# Patient Record
Sex: MALE | Race: WHITE | NOT HISPANIC OR LATINO | Employment: FULL TIME | URBAN - METROPOLITAN AREA
[De-identification: names, ages, dates, MRNs, and addresses within clinical notes are randomized per-mention and may not be internally consistent; named-entity substitution may affect disease eponyms.]

---

## 2019-02-20 ENCOUNTER — HISTORICAL (OUTPATIENT)
Dept: ADMINISTRATIVE | Facility: HOSPITAL | Age: 59
End: 2019-02-20

## 2019-02-20 LAB
ABS NEUT (OLG): 3.3 X10(3)/MCL (ref 2.1–9.2)
ALBUMIN SERPL-MCNC: 4.6 GM/DL (ref 3.4–5)
ALBUMIN/GLOB SERPL: 2.19 {RATIO} (ref 1.5–2.5)
ALP SERPL-CCNC: 48 UNIT/L (ref 38–126)
ALT SERPL-CCNC: 27 UNIT/L (ref 7–52)
APPEARANCE, UA: CLEAR
AST SERPL-CCNC: 25 UNIT/L (ref 15–37)
BACTERIA #/AREA URNS AUTO: NORMAL /HPF
BILIRUB SERPL-MCNC: 0.7 MG/DL (ref 0.2–1)
BILIRUB UR QL STRIP: NEGATIVE MG/DL
BILIRUBIN DIRECT+TOT PNL SERPL-MCNC: 0.2 MG/DL (ref 0–0.5)
BILIRUBIN DIRECT+TOT PNL SERPL-MCNC: 0.5 MG/DL
BUN SERPL-MCNC: 14 MG/DL (ref 7–18)
CALCIUM SERPL-MCNC: 9.4 MG/DL (ref 8.5–10)
CHLORIDE SERPL-SCNC: 104 MMOL/L (ref 98–107)
CHOLEST SERPL-MCNC: 186 MG/DL (ref 0–200)
CHOLEST/HDLC SERPL: 3.7 {RATIO}
CO2 SERPL-SCNC: 33 MMOL/L (ref 21–32)
COLOR UR: YELLOW
CREAT SERPL-MCNC: 0.92 MG/DL (ref 0.6–1.3)
ERYTHROCYTE [DISTWIDTH] IN BLOOD BY AUTOMATED COUNT: 12.1 % (ref 11.5–17)
GLOBULIN SER-MCNC: 2 GM/DL (ref 1.2–3)
GLUCOSE (UA): NEGATIVE MG/DL
GLUCOSE SERPL-MCNC: 106 MG/DL (ref 74–106)
HCT VFR BLD AUTO: 50 % (ref 42–52)
HDLC SERPL-MCNC: 50 MG/DL (ref 35–60)
HGB BLD-MCNC: 16 GM/DL (ref 14–18)
HGB UR QL STRIP: NEGATIVE UNIT/L
KETONES UR QL STRIP: NORMAL MG/DL
LDLC SERPL CALC-MCNC: 107 MG/DL (ref 0–129)
LEUKOCYTE ESTERASE UR QL STRIP: NEGATIVE UNIT/L
LYMPHOCYTES # BLD AUTO: 1.8 X10(3)/MCL (ref 0.6–3.4)
LYMPHOCYTES NFR BLD AUTO: 31.4 % (ref 13–40)
MCH RBC QN AUTO: 32.2 PG (ref 27–31.2)
MCHC RBC AUTO-ENTMCNC: 32 GM/DL (ref 32–36)
MCV RBC AUTO: 101 FL (ref 80–94)
MONOCYTES # BLD AUTO: 0.7 X10(3)/MCL (ref 0.1–1.3)
MONOCYTES NFR BLD AUTO: 11.7 % (ref 0.1–24)
NEUTROPHILS NFR BLD AUTO: 56.9 % (ref 47–80)
NITRITE UR QL STRIP.AUTO: NEGATIVE
PH UR STRIP: 6 [PH]
PLATELET # BLD AUTO: 252 X10(3)/MCL (ref 130–400)
PMV BLD AUTO: 8.9 FL (ref 9.4–12.4)
POTASSIUM SERPL-SCNC: 4.5 MMOL/L (ref 3.5–5.1)
PROT SERPL-MCNC: 6.7 GM/DL (ref 6.4–8.2)
PROT UR QL STRIP: NEGATIVE MG/DL
PSA SERPL-MCNC: 0.41 NG/ML (ref 0–3.5)
RBC # BLD AUTO: 4.97 X10(6)/MCL (ref 4.7–6.1)
RBC #/AREA URNS HPF: NORMAL /HPF
SODIUM SERPL-SCNC: 139 MMOL/L (ref 136–145)
SP GR UR STRIP: 1.02
SQUAMOUS EPITHELIAL, UA: NORMAL /LPF
TRIGL SERPL-MCNC: 62 MG/DL (ref 30–150)
UROBILINOGEN UR STRIP-ACNC: 0.2 MG/DL
VLDLC SERPL CALC-MCNC: 12.4 MG/DL
WBC # SPEC AUTO: 5.8 X10(3)/MCL (ref 4.5–11.5)
WBC #/AREA URNS AUTO: NORMAL /[HPF]

## 2019-11-01 ENCOUNTER — HISTORICAL (OUTPATIENT)
Dept: ADMINISTRATIVE | Facility: HOSPITAL | Age: 59
End: 2019-11-01

## 2019-11-01 LAB
BUN SERPL-MCNC: 19 MG/DL (ref 7–18)
CALCIUM SERPL-MCNC: 9.9 MG/DL (ref 8.5–10)
CHLORIDE SERPL-SCNC: 101 MMOL/L (ref 98–107)
CO2 SERPL-SCNC: 30 MMOL/L (ref 21–32)
CREAT SERPL-MCNC: 1.1 MG/DL (ref 0.6–1.3)
CREAT/UREA NIT SERPL: 17.3
GLUCOSE SERPL-MCNC: 112 MG/DL (ref 74–106)
POTASSIUM SERPL-SCNC: 5.1 MMOL/L (ref 3.5–5.1)
SODIUM SERPL-SCNC: 138 MMOL/L (ref 136–145)

## 2020-02-27 ENCOUNTER — HISTORICAL (OUTPATIENT)
Dept: ADMINISTRATIVE | Facility: HOSPITAL | Age: 60
End: 2020-02-27

## 2020-02-27 LAB
ABS NEUT (OLG): 5.2 X10(3)/MCL (ref 2.1–9.2)
ALBUMIN SERPL-MCNC: 4.4 GM/DL (ref 3.4–5)
ALBUMIN/GLOB SERPL: 1.69 {RATIO} (ref 1.5–2.5)
ALP SERPL-CCNC: 71 UNIT/L (ref 38–126)
ALT SERPL-CCNC: 22 UNIT/L (ref 7–52)
APPEARANCE, UA: CLEAR
AST SERPL-CCNC: 22 UNIT/L (ref 15–37)
BACTERIA #/AREA URNS AUTO: NORMAL /HPF
BILIRUB SERPL-MCNC: 0.7 MG/DL (ref 0.2–1)
BILIRUB UR QL STRIP: NEGATIVE MG/DL
BILIRUBIN DIRECT+TOT PNL SERPL-MCNC: 0.2 MG/DL (ref 0–0.5)
BILIRUBIN DIRECT+TOT PNL SERPL-MCNC: 0.5 MG/DL
BUN SERPL-MCNC: 15 MG/DL (ref 7–18)
CALCIUM SERPL-MCNC: 9.5 MG/DL (ref 8.5–10)
CHLORIDE SERPL-SCNC: 104 MMOL/L (ref 98–107)
CHOLEST SERPL-MCNC: 111 MG/DL (ref 0–200)
CHOLEST/HDLC SERPL: 2.6 {RATIO}
CO2 SERPL-SCNC: 29 MMOL/L (ref 21–32)
COLOR UR: YELLOW
CREAT SERPL-MCNC: 1.08 MG/DL (ref 0.6–1.3)
ERYTHROCYTE [DISTWIDTH] IN BLOOD BY AUTOMATED COUNT: 14.2 % (ref 11.5–17)
GLOBULIN SER-MCNC: 2.6 GM/DL (ref 1.2–3)
GLUCOSE (UA): NEGATIVE MG/DL
GLUCOSE SERPL-MCNC: 118 MG/DL (ref 74–106)
HCT VFR BLD AUTO: 49.2 % (ref 42–52)
HDLC SERPL-MCNC: 42 MG/DL (ref 35–60)
HGB BLD-MCNC: 15.6 GM/DL (ref 14–18)
HGB UR QL STRIP: NEGATIVE UNIT/L
KETONES UR QL STRIP: NORMAL MG/DL
LDLC SERPL CALC-MCNC: 57 MG/DL (ref 0–129)
LEUKOCYTE ESTERASE UR QL STRIP: NEGATIVE UNIT/L
LYMPHOCYTES # BLD AUTO: 2.2 X10(3)/MCL (ref 0.6–3.4)
LYMPHOCYTES NFR BLD AUTO: 27.6 % (ref 13–40)
MCH RBC QN AUTO: 28.4 PG (ref 27–31.2)
MCHC RBC AUTO-ENTMCNC: 32 GM/DL (ref 32–36)
MCV RBC AUTO: 90 FL (ref 80–94)
MONOCYTES # BLD AUTO: 0.7 X10(3)/MCL (ref 0.1–1.3)
MONOCYTES NFR BLD AUTO: 8.3 % (ref 0.1–24)
NEUTROPHILS NFR BLD AUTO: 64.1 % (ref 47–80)
NITRITE UR QL STRIP.AUTO: NEGATIVE
PH UR STRIP: 6.5 [PH]
PLATELET # BLD AUTO: 246 X10(3)/MCL (ref 130–400)
PMV BLD AUTO: 9.4 FL (ref 9.4–12.4)
POTASSIUM SERPL-SCNC: 5 MMOL/L (ref 3.5–5.1)
PROT SERPL-MCNC: 7 GM/DL (ref 6.4–8.2)
PROT UR QL STRIP: NEGATIVE MG/DL
PSA SERPL-MCNC: 0.31 NG/ML (ref 0–3.5)
RBC # BLD AUTO: 5.5 X10(6)/MCL (ref 4.7–6.1)
RBC #/AREA URNS HPF: NORMAL /HPF
SODIUM SERPL-SCNC: 140 MMOL/L (ref 136–145)
SP GR UR STRIP: 1.02
SQUAMOUS EPITHELIAL, UA: NORMAL /LPF
TRIGL SERPL-MCNC: 59 MG/DL (ref 30–150)
UROBILINOGEN UR STRIP-ACNC: 0.2 MG/DL
VLDLC SERPL CALC-MCNC: 11.8 MG/DL
WBC # SPEC AUTO: 8.1 X10(3)/MCL (ref 4.5–11.5)
WBC #/AREA URNS AUTO: NORMAL /[HPF]

## 2022-04-10 ENCOUNTER — HISTORICAL (OUTPATIENT)
Dept: ADMINISTRATIVE | Facility: HOSPITAL | Age: 62
End: 2022-04-10

## 2022-04-25 VITALS
SYSTOLIC BLOOD PRESSURE: 140 MMHG | DIASTOLIC BLOOD PRESSURE: 80 MMHG | HEIGHT: 69 IN | OXYGEN SATURATION: 96 % | WEIGHT: 172.63 LBS | BODY MASS INDEX: 25.57 KG/M2

## 2022-05-03 NOTE — HISTORICAL [facility] CERNER
This is a historical note converted from Jason. Formatting and pictures may have been removed.  Please reference Jason for original formatting and attached multimedia. Chief Complaint  C/O ELEVATED BP X 1 WK. DENIES CHEST PAIN  History of Present Illness  ?  57 y/o male here for follow up.  BP has been running higher.  says he has cut back on beer and his BP has been higher.  says this morning his BP was good at home and he is feeling better than the past few days.  he exercises 5 days per week.  he denies chest pain, denies palpitations  curretnly takes fosinopril and norvasc.  takes buspirone 15mg BID,  ?  ?  Dr. Stock hx;  Yearly exam.? Feeling well.? Needs refill on Norco.? Does aerobic and?weightlifting exercises at least 5 times a week.? Fasting for lab.? Recommended shingles and?flu vaccine-declined.? Again strongly recommended colonoscope.  Review of Systems  HEENT -?[facility] [place]-yrly-? eye exam  ?? 2013 Concussion  ?? Headaches - Norco prn (uses less than 20/yr.  CHEST? - no SOB  C/V - Dr. Esposito-prn - 2016 CACS- 807? - work up wnl  GI? - Dr Spicer ?colonoscope - never done - advised cancer screen   - Dr. Borja - s/p vasectomy  M/S - Dr Ramirez - s/p right knee ACL  CHRONIC ANXIETY - Rx -Buspar/Xanax- doing well  LAST CPX? 1/24/18  Review of Systems  Constitutional: No fever, No chills, No sweats, No weakness, No fatigue  ?????Eye: No blurring, No double vision.  ?????Ear/Nose/Mouth/Throat: No nasal congestion, No sore throat.  ?????Respiratory: No shortness of breath, No cough, No wheezing, No cyanosis.  ?????Cardiovascular: No chest pain, No palpitations, No bradycardia, No tachycardia, No peripheral edema.  ?????Gastrointestinal: No nausea, No vomiting, No diarrhea, No constipation, No abdominal pain.  ?????Genitourinary: No dysuria, No hematuria.  ?????Musculoskeletal: No back pain, No neck pain, No joint pain, No muscle pain, No claudication.  ?????Integumentary: No  rash.  ?????Neurologic:No abnormal balance, No confusion, No numbness, No tingling, No headache.  ?????Psychiatric: anxiety  Physical Exam  Vitals & Measurements  HR:?84(Peripheral)? BP:?160/92?  HT:?175?cm? WT:?83.3?kg? BMI:?27.2?  ????General: Alert and oriented, No acute distress.  ?????Eye: Extraocular movements are intact, Normal conjunctiva.  ?????HENT: Normocephalic,? Oral mucosa is moist.  ?????Neck: Supple, Non-tender, No jugular venous distention, No lymphadenopathy, No thyromegaly.  ?????Respiratory: Lungs are clear to auscultation, Respirations are non-labored, Breath sounds are equal, Symmetrical chest wall expansion, No chest wall tenderness.  ?????Cardiovascular: Normal rate, Regular rhythm, No gallop, Good pulses equal in all extremities, Normal peripheral perfusion, No edema.  ?????Gastrointestinal: Soft, Non-tender, Non-distended, Normal bowel sounds.  ?????Musculoskeletal: Normal range of motion, Normal gait.  ?????Integumentary: Warm, Dry, Intact.  ?????Neurologic: Alert, Oriented, No focal deficits.  ?????Psychiatric: Cooperative, Appropriate mood & affect  Assessment/Plan  1.?HTN (hypertension)?I10  BMP> increase fosinopril to 40mg , cont amlodipine monitor home BP, nurse visit within 2 weeks on increased dose. follow up wellness scheduled  Ordered:  Basic Metabolic Panel, Routine collect, 11/01/19 9:43:00 CDT, Blood, Order for future visit, Stop date 11/01/19 9:43:00 CDT, Lab Collect, HTN (hypertension), 11/01/19 9:43:00 CDT  Clinic Follow up, *Est. 11/15/19 3:00:00 CST, nurse visit BP check with log and cuff, Order for future visit, HTN (hypertension), HLink AFP  ?  Orders:  fosinopril, 40 mg = 1 tab(s), Oral, Daily, # 30 tab(s), 1 Refill(s), Pharmacy: [id number]  Lab Collection Request, 11/01/19 9:44:00 CDT, CATHY AMB - AFP, 11/01/19 9:44:00 CDT  Referrals  Clinic Follow up, *Est. 11/15/19 3:00:00 CST, nurse visit BP check with log and cuff, Order for future visit, HTN  (hypertension), HLink AFP   Problem List/Past Medical History  Ongoing  DDD (degenerative disc disease), cervical  Elevated coronary artery calcium score  Hypertension  Historical  No qualifying data  Procedure/Surgical History  Vasectomy (11/01/2014)  Right Knee - ACL (1991)   Medications  amLODIPine 2.5 mg oral tablet, 2.5 mg= 1 tab(s), Oral, Daily, 5 refills  amoxicillin 500 mg oral capsule, 500 mg= 1 cap(s), Oral, BID,? ?Not Taking, Completed Rx  busPIRone 15 mg oral tablet, See Instructions, 11 refills  fosinopril 40 mg oral tablet, 40 mg= 1 tab(s), Oral, Daily, 1 refills  Norco 7.5 mg-325 mg oral tablet, See Instructions,? ?Not taking  Allergies  Toprol-XL?(insomnia)  hydroCHLOROthiazide?(Unknown)  Social History  Abuse/Neglect  No, 11/01/2019  Alcohol  Never, 05/29/2018  Employment/School  Employed, 05/29/2018  Exercise  Exercise frequency: 5-6 times/week. Exercise type: Gym- Cardio., 05/29/2018  Home/Environment  Lives with Spouse., 05/29/2018  Tobacco  Never (less than 100 in lifetime), No, 11/01/2019  Never smoker Use:., 05/29/2018  Family History  Carotid artery: Brother.  Carotid artery disease: Brother.  Coronary artery disease: Father and Father.  Diabetes mellitus type 2: Sister and Sister.  Hypercholesterolaemia: Sister and Sister.  Hypertension: Sister and Brother.  Hypertension.: Sister, Sister, Sister, Sister and Brother.  Peripheral arterial disease: Mother and Mother.  Stroke: Mother and Mother.  Immunizations  Vaccine Date Status   influenza virus vaccine, inactivated 10/15/2019 Recorded   Health Maintenance  Health Maintenance  ???Pending?(in the next year)  ??? ??OverDue  ??? ? ? ?Diabetes Screening due??and every?  ??? ? ? ?Influenza Vaccine due??and every?  ??? ??Due?  ??? ? ? ?ADL Screening due??11/01/19??and every 1??year(s)  ??? ? ? ?Aspirin Therapy for CVD Prevention due??11/01/19??and every 1??year(s)  ??? ? ? ?Colorectal Screening due??11/01/19??and every?  ??? ? ? ?Depression  Screening due??11/01/19??and every?  ??? ? ? ?Tetanus Vaccine due??11/01/19??and every 10??year(s)  ??? ??Due In Future?  ??? ? ? ?Alcohol Misuse Screening not due until??01/01/20??and every 1??year(s)  ??? ? ? ?Obesity Screening not due until??01/01/20??and every 1??year(s)  ??? ? ? ?Hypertension Management-BMP not due until??02/20/20??and every 1??year(s)  ??? ? ? ?Blood Pressure Screening not due until??10/31/20??and every 1??year(s)  ??? ? ? ?Body Mass Index Check not due until??10/31/20??and every 1??year(s)  ??? ? ? ?Hypertension Management-Blood Pressure not due until??10/31/20??and every 1??year(s)  ???Satisfied?(in the past 1 year)  ??? ??Satisfied?  ??? ? ? ?Alcohol Misuse Screening on??11/01/19.??Satisfied by Henna Powell MD  ??? ? ? ?Blood Pressure Screening on??11/01/19.??Satisfied by Henna Powell MD  ??? ? ? ?Body Mass Index Check on??11/01/19.??Satisfied by Sarah Lucio LPN  ??? ? ? ?Diabetes Screening on??02/20/19.??Satisfied by Eben Manning  ??? ? ? ?Hypertension Management-Blood Pressure on??11/01/19.??Satisfied by Henna Powell MD  ??? ? ? ?Hypertension Management-Education on??11/01/19.??Satisfied by Henna Powell MD  ??? ? ? ?Hypertension Management-BMP on??02/20/19.??Satisfied by Eben Manning  ??? ? ? ?Influenza Vaccine on??10/15/19.??Satisfied by Sarah Lucio LPN  ??? ? ? ?Lipid Screening on??02/20/19.??Satisfied by Eben Manning  ??? ? ? ?Obesity Screening on??11/01/19.??Satisfied by Sarah Lucio LPN  ?

## 2022-05-03 NOTE — HISTORICAL [facility]ERN[facility]
This is a historical note converted from [place]. Formatting and pictures may have been removed.  Please reference Jason for original formatting and attached multimedia. Chief Complaint  Wellness- fasting  History of Present Illness  Yearly exam.? Feeling well.? Needs refill on Norco.? Does aerobic and?weightlifting exercises at least 5 times a week.? Fasting for lab.? Recommended shingles and?flu vaccine-declined.? Again strongly recommended colonoscope.  Review of Systems  HEENT -?[facility] [place]-yrly-? eye exam  ?? 2013 Concussion  ?? Headaches - Norco prn (uses less than 20/yr.  CHEST? - no SOB  C/V - Dr. Esposito-prn - 2016 CACS- 807? - work up wnl  GI? - Dr Spicer ?colonoscope - never done - advised cancer screen   - Dr. Borja - s/p vasectomy  M/S - Dr Ramirez - s/p right knee ACL  CHRONIC ANXIETY - Rx -Buspar/Xanax- doing well  LAST CPX? 1/24/18  ?  Physical Exam  Vitals & Measurements  HR:?71(Peripheral)? BP:?128/79? SpO2:?96%?  HT:?175?cm? WT:?80.8?kg? BMI:?26.38?  GEN?58-year-old white male. ?Well-developed. ?Well-nourished. ?No acute distress.  SKIN?clear  PULSE?regular  HEENT?normal  NECK?no bruits  THYROID?normal  CHEST?clear  HEART?regular rate and rhythm without murmur  ABDOMEN?soft nontender no masses?  GENITALIA?normal male. ?No hernia.  RECTAL?no masses  PROSTATE?normal  EXTREMITIES?no edema  Assessment/Plan  1.?Wellness examination?Z00.00  Ordered:  CBC w/ Auto Diff, Routine collect, 02/20/19 10:00:00 CST, Blood, Order for future visit, Stop date 02/20/19 10:00:00 CST, Lab Collect, Wellness examination, 02/20/19 10:00:00 CST  Clinic Follow up, *Est. 02/20/20 3:00:00 CST, Order for future visit, Wellness examination, HLink AFP  Comprehensive Metabolic Panel, Routine collect, 02/20/19 10:00:00 CST, Blood, Order for future visit, Stop date 02/20/19 10:00:00 CST, Lab Collect, Wellness examination, 02/20/19 10:00:00 CST  Lab Collection Request, 02/20/19 10:00:00 CST, CATHY AMB - AFP,  02/20/19 10:00:00 CST  Lipid Panel, Routine collect, 02/20/19 10:00:00 CST, Blood, Order for future visit, Stop date 02/20/19 10:00:00 CST, Lab Collect, Wellness examination, 02/20/19 10:00:00 CST  Preventative Health Care Est 40-64 years [id number] PC, Wellness examination, HLINK AMB - AFP, 02/20/19 9:59:00 CST  Prostate Specific Antigen, Routine collect, 02/20/19 10:00:00 CST, Blood, Order for future visit, Stop date 02/20/19 10:00:00 CST, Lab Collect, Wellness examination, 02/20/19 10:00:00 CST  Urinalysis Complete no reflex, Routine collect, Urine, Order for future visit, 02/20/19 10:00:00 CST, Stop date 02/20/19 10:00:00 CST, Nurse collect, Wellness examination  ?  Orders:  acetaminophen-HYDROcodone, See Instructions, 1 tab(s) Oral q6hr prn headache, # 20 tab(s), 0 Refill(s)   Problem List/Past Medical History  Ongoing  DDD (degenerative disc disease), cervical  Elevated coronary artery calcium score  Hypertension  Historical  No qualifying data  Procedure/Surgical History  Vasectomy (11/01/2014)  Right Knee - ACL (1991)   Medications  AMLODIPINE BESYLATE 2.5MG TABLETS, 2.5 mg= 1 tab(s), Oral, Daily  busPIRone 15 mg oral tablet, See Instructions, 1 refills  fosinopril 20 mg oral tablet, See Instructions, 1 refills  Norco 7.5 mg-325 mg oral tablet, See Instructions  Allergies  Toprol-XL?(insomnia)  hydroCHLOROthiazide?(Unknown)  Social History  Alcohol  Never, 05/29/2018  Employment/School  Employed, 05/29/2018  Exercise  Exercise frequency: 5-6 times/week. Exercise type: Gym- Cardio., 05/29/2018  Home/Environment  Lives with Spouse., 05/29/2018  Tobacco  Former smoker, quit more than 30 days ago, No, 02/20/2019  Never smoker Use:., 05/29/2018  Family History  Carotid artery: Brother.  Carotid artery disease: Brother.  Coronary artery disease: Father and Father.  Diabetes mellitus type 2: Sister and Sister.  Hypercholesterolaemia: Sister and Sister.  Hypertension: Sister and Brother.  Hypertension.: Sister, Sister,  Sister, Sister and Brother.  Peripheral arterial disease: Mother and Mother.  Stroke: Mother and Mother.  Health Maintenance  Health Maintenance  ???Pending?(in the next year)  ??? ??OverDue  ??? ? ? ?Hypertension Management-BMP due??01/24/19??and every 1??year(s)  ??? ??Due?  ??? ? ? ?ADL Screening due??02/20/19??and every 1??year(s)  ??? ? ? ?Alcohol Misuse Screening due??02/20/19??and every 1??year(s)  ??? ? ? ?Aspirin Therapy for CVD Prevention due??02/20/19??and every 1??year(s)  ??? ? ? ?Colorectal Screening due??02/20/19??and every?  ??? ? ? ?Depression Screening due??02/20/19??and every?  ??? ? ? ?Diabetes Screening due??02/20/19??and every?  ??? ? ? ?Hypertension Management-Education due??02/20/19??and every 1??year(s)  ??? ? ? ?Smoking Cessation due??02/20/19??Variable frequency  ??? ? ? ?Tetanus Vaccine due??02/20/19??and every 10??year(s)  ???Satisfied?(in the past 1 year)  ??? ??Satisfied?  ??? ? ? ?Blood Pressure Screening on??02/20/19.??Satisfied by Shaun Barnett CMAa M.  ??? ? ? ?Body Mass Index Check on??02/20/19.??Satisfied by Anibal MARSHALL Brie M.  ??? ? ? ?Hypertension Management-Blood Pressure on??02/20/19.??Satisfied by Anibal MARSHALL Brie M.  ??? ? ? ?Influenza Vaccine on??02/20/19.??Satisfied by Anibal MARSHALL Brie M.  ??? ? ? ?Obesity Screening on??02/20/19.??Satisfied by Anibal MARSHALL Brie M.  ?  ?      2/20/19 LAB:? CBC, CMP, LIPIDS, PSA, U/A - NORMAL.

## 2022-05-03 NOTE — HISTORICAL [facility] CERNER
This is a historical note converted from Jason. Formatting and pictures may have been removed.  Please reference Jason for original formatting and attached multimedia. Chief Complaint  CPX  History of Present Illness  60 y/o male here for annual wellness.  had CABG  wife going through breast cancer treatment  says he is doing well, exercising.  seeing [facility]. - Dr. Baker.  colonoscopy was scheduled but he had to cancel due to CABG  plans to re-schedule.  taking buspar for anxiety  ?  ?  ?  Dr. Stock hx;  HEENT -?[facility] [place]-yrly-? eye exam  ?? 2013 Concussion  ?? Headaches - Norco prn (uses less than 20/yr.  CHEST? - no SOB  C/V - Dr. Esposito-prn - 2016 CACS- 807? - work up wnl  GI? - Dr Spicer ?colonoscope - never done - advised cancer screen   - Dr. Borja - s/p vasectomy  M/S - Dr Ramirez - s/p right knee ACL  CHRONIC ANXIETY - Rx -Buspar/Xanax- doing well  LAST CPX? 1/24/18  Review of Systems  Constitutional: No fever, No chills, No sweats, No weakness, No fatigue  ?????Eye: No blurring, No double vision.  ?????Ear/Nose/Mouth/Throat: No nasal congestion, No sore throat.  ?????Respiratory: No shortness of breath, No cough, No wheezing, No cyanosis.  ?????Cardiovascular: No chest pain, No palpitations, No bradycardia, No tachycardia, No peripheral edema.  ?????Gastrointestinal: No nausea, No vomiting, No diarrhea, No constipation, No abdominal pain.  ?????Genitourinary: No dysuria, No hematuria.  ?????Musculoskeletal: No back pain, No neck pain, No joint pain, No muscle pain, No claudication.  ?????Integumentary: No rash.  ?????Neurologic:? No abnormal balance, No confusion, No numbness, No tingling, No headache.  ?????Psychiatric: No anxiety, No depression.  Physical Exam  Vitals & Measurements  HR:?68(Peripheral)? BP:?140/80?  HT:?175?cm? WT:?78.3?kg? BMI:?25.57?  ????General: Alert and oriented, No acute distress.  ?????Eye: Extraocular movements are intact, Normal  conjunctiva.  ?????HENT: Normocephalic, Normal hearing, Oral mucosa is moist.  ?????Neck: Supple, Non-tender, No carotid bruit, No jugular venous distention, No lymphadenopathy, No thyromegaly.  ?????Respiratory: Lungs are clear to auscultation, Respirations are non-labored, Breath sounds are equal, Symmetrical chest wall expansion, No chest wall tenderness.  ?????Cardiovascular: Normal rate, Regular rhythm, No murmur, No gallop, Good pulses equal in all extremities, Normal peripheral perfusion, No edema.  ?????Gastrointestinal: Soft, Non-tender, Non-distended, Normal bowel sounds.  ?????Musculoskeletal: Normal range of motion, Normal gait.  ?????Integumentary: Warm, Dry, Intact.  ?????Neurologic: Alert, Oriented, No focal deficits.  ?????Psychiatric: Cooperative, Appropriate mood & affect  Assessment/Plan  1.?Wellness examination?Z00.00  Ordered:  CBC w/ Auto Diff, Routine collect, 02/27/20 8:37:00 CST, Blood, Stop date 02/27/20 8:37:00 CST, Lab Collect, Wellness examination, 02/27/20 8:37:00 CST  Clinic Follow-Up Wellness, *Est. 02/27/21 3:00:00 CST, Order for future visit, Wellness examination  Hypertension  Hx of CABG, HLink AFP  Comprehensive Metabolic Panel, Routine collect, 02/27/20 8:37:00 CST, Blood, Stop date 02/27/20 8:37:00 CST, Lab Collect, Wellness examination, 02/27/20 8:37:00 CST  External Referral, colonoscopy, GI, Dr. Spicer, 02/27/20 8:27:00 CST, Wellness examination  Lab Collection Request, 02/27/20 8:26:00 CST, HLINK AMB - AFP, 02/27/20 8:26:00 CST, Wellness examination  Lipid Panel, Routine collect, 02/27/20 8:37:00 CST, Blood, Stop date 02/27/20 8:37:00 CST, Lab Collect, Wellness examination, 02/27/20 8:37:00 CST  Prostate Specific Antigen, Routine collect, 02/27/20 8:37:00 CST, Blood, Stop date 02/27/20 8:37:00 CST, Lab Collect, Wellness examination, 02/27/20 8:37:00 CST  Urinalysis Complete no reflex, Routine collect, Urine, 02/27/20 8:37:00 CST, Stop date 02/27/20 8:37:00 CST, Nurse  collect, Wellness examination  ?  2.?Hypertension?I10  Ordered:  Clinic Follow-Up Wellness, *Est. 02/27/21 3:00:00 CST, Order for future visit, Wellness examination  Hypertension  Hx of CABG, HLink AFP  ?  3.?Hx of CABG?Z95.1  Ordered:  Clinic Follow-Up Wellness, *Est. 02/27/21 3:00:00 CST, Order for future visit, Wellness examination  Hypertension  Hx of CABG, HLink AFP  ?  has cardiology follow up- s/p CABG  due for colonoscopy- has seen Dr. Castillo in the past- referral placed if needed  fasting labs- forward to Dr. Baker- [facility]  continue exercise, medications  annual wellness, sooner as needed  ?   Problem List/Past Medical History  Ongoing  DDD (degenerative disc disease), cervical  Elevated coronary artery calcium score  Hypertension  Historical  No qualifying data  Procedure/Surgical History  Vasectomy (11/01/2014)  Right Knee - ACL (1991)   Medications  amLODIPine 2.5 mg oral tablet, 2.5 mg= 1 tab(s), Oral, Daily, 2 refills,? ?Not Taking per Prescriber  amLODIPine 2.5 mg oral tablet, See Instructions,? ?Not Taking per Prescriber  amoxicillin 500 mg oral capsule, 500 mg= 1 cap(s), Oral, BID,? ?Not Taking, Completed Rx  atorvastatin 40 mg oral tablet, 40 mg= 1 tab(s), Oral, qPM  busPIRone 15 mg oral tablet, See Instructions, 11 refills  carvedilol 6.25 mg oral tablet, 6.25 mg= 1 tab(s), Oral, BID  clopidogrel 75 mg oral tablet, 75 mg= 1 tab(s), Oral, Daily  fosinopril 40 mg oral tablet, 40 mg= 1 tab(s), Oral, Daily, 1 refills,? ?Not taking  Norco 7.5 mg-325 mg oral tablet, See Instructions,? ?Not taking  Allergies  Toprol-XL?(insomnia)  hydroCHLOROthiazide?(Unknown)  Social History  Abuse/Neglect  No, 02/27/2020  No, 11/01/2019  Alcohol  Never, 05/29/2018  Employment/School  Employed, 05/29/2018  Exercise  Exercise frequency: 5-6 times/week. Exercise type: Gym- Cardio., 05/29/2018  Home/Environment  Lives with Spouse., 05/29/2018  Tobacco  Never (less than 100 in lifetime), No, 02/27/2020  Never  (less than 100 in lifetime), No, 11/01/2019  Family History  Carotid artery: Brother.  Carotid artery disease: Brother.  Coronary artery disease: Father and Father.  Diabetes mellitus type 2: Sister and Sister.  Hypercholesterolaemia: Sister and Sister.  Hypertension: Sister and Brother.  Hypertension.: Sister, Sister, Sister, Sister and Brother.  Peripheral arterial disease: Mother and Mother.  Stroke: Mother and Mother.  Immunizations  Vaccine Date Status   influenza virus vaccine, inactivated 10/15/2019 Recorded   Health Maintenance  Health Maintenance  ???Pending?(in the next year)  ??? ??OverDue  ??? ? ? ?Coronary Artery Disease Maintenance-Antiplatelet Agent Prescribed due??and every?  ??? ? ? ?Coronary Artery Disease Maintenance-Lipid Lowering Therapy due??and every?  ??? ? ? ?Diabetes Screening due??and every?  ??? ??Due?  ??? ? ? ?Colorectal Screening due??02/27/20??and every?  ??? ? ? ?Depression Screening due??02/27/20??and every?  ??? ? ? ?HF-Heart Failure Education due??02/27/20??and every?  ??? ? ? ?HF-LVEF due??02/27/20??and every?  ??? ? ? ?Tetanus Vaccine due??02/27/20??and every 10??year(s)  ??? ??Due In Future?  ??? ? ? ?Smoking Cessation (Coronary Artery Disease) not due until??05/28/20??and every 2??year(s)  ??? ? ? ?Hypertension Management-BMP not due until??10/31/20??and every 1??year(s)  ??? ? ? ?Hypertension Management-Education not due until??11/01/20??and every 1??year(s)  ??? ? ? ?Alcohol Misuse Screening not due until??01/01/21??and every 1??year(s)  ??? ? ? ?Obesity Screening not due until??01/01/21??and every 1??year(s)  ??? ? ? ?Blood Pressure Screening not due until??02/26/21??and every 1??year(s)  ??? ? ? ?Body Mass Index Check not due until??02/26/21??and every 1??year(s)  ??? ? ? ?Hypertension Management-Blood Pressure not due until??02/26/21??and every 1??year(s)  ???Satisfied?(in the past 1 year)  ??? ??Satisfied?  ??? ? ? ?ADL Screening on??02/27/20.??Satisfied by Andre SAUNDERS,  Henna  ??? ? ? ?Alcohol Misuse Screening on??02/27/20.??Satisfied by Henna Powell MD  ??? ? ? ?Aspirin Therapy for CVD Prevention on??02/27/20.??Satisfied by Henna Powell MD  ??? ? ? ?Blood Pressure Screening on??02/27/20.??Satisfied by Sarah Lucio LPN  ??? ? ? ?Body Mass Index Check on??02/27/20.??Satisfied by Sarah Lucio LPN  ??? ? ? ?Coronary Artery Disease Maintenance-Antiplatelet Agent Prescribed on??02/27/20.  ??? ? ? ?Diabetes Screening on??11/01/19.??Satisfied by Eben Manning  ??? ? ? ?Hypertension Management-Blood Pressure on??02/27/20.??Satisfied by Sarah Lucio LPN  ??? ? ? ?Hypertension Management-BMP on??11/01/19.??Satisfied by Eben Manning  ??? ? ? ?Hypertension Management-Education on??11/01/19.??Satisfied by Henna Powell MD  ??? ? ? ?Influenza Vaccine on??10/15/19.??Satisfied by Sarah Lucio LPN  ??? ? ? ?Obesity Screening on??02/27/20.??Satisfied by Sarah Lucio LPN  ?

## 2023-11-15 RX ORDER — BUSPIRONE HYDROCHLORIDE 15 MG/1
TABLET ORAL
COMMUNITY
Start: 2023-10-09

## 2023-11-15 RX ORDER — AMLODIPINE AND BENAZEPRIL HYDROCHLORIDE 10; 20 MG/1; MG/1
1 CAPSULE ORAL EVERY MORNING
COMMUNITY
Start: 2023-02-02

## 2023-11-15 RX ORDER — IPRATROPIUM BROMIDE 42 UG/1
SPRAY, METERED NASAL
COMMUNITY
Start: 2022-12-08

## 2023-11-15 RX ORDER — NAPROXEN SODIUM 220 MG/1
1 TABLET, FILM COATED ORAL EVERY MORNING
COMMUNITY

## 2023-11-15 NOTE — PROGRESS NOTES
"    [facility] Vascular - Clinic Note  Odette Nicholas MD      Patient Name: Flip Kim                   : 1960      MRN: [id number]   Visit Date: 2023       History Present Illness     Reason for Visit: Carotid Artery Disease    Mr. Kim presents to the clinic for evaluation of severe carotid disease. He is being referred by Dr. Santos following recent CTA neck 10/20/23. He denies signs or symptoms of stroke or TIA. He reports some recent episodes of dizziness with change in position. He denies recent hospitalizations. Denies neck surgeries or radiation. Reports CABG in 2019.    He reports recent headaches onset of a few weeks ago. He reports ambulating daily without restrictions. Never a smoker.      REVIEW OF SYSTEMS:  ROS  12 point review of systems conducted, negative except as stated in the history of present illness. See HPI for details.        Physical Exam      Vitals:    23 1038 23 1039   BP: (!) 140/86 (!) 143/83   BP Location: Left arm Right arm   Pulse: 65 61   Weight:  77.1 kg (170 lb)   Height:  5' 8.9" (1.75 m)          General: well-nourished, no acute distress, and healthy appearing, ambulating normally, alert, pleasant, conversant, and oriented  Neurologic: cranial nerves are grossly intact marginal mandibular of the facial nerve and hypoglossal nerve demonstrates appropriate function, no neurologic deficits, no motor deficits, and no sensory deficits  HEENT: grossly normal and no scleral icterus  Neck/Chest: normal  and soft without lymphadenopathy  Respiratory: breathing easily and without respiratory distress  Abdomen: normal and soft  Cardiology: regular rate and rhythm    Upper Extremity Arterial Exam:   Right - radial is palpable  Left - radial is palpable    Musculoskeletal:   Upper Extremity: normal bilateral hand function  Lower Extremity: no edema present to bilateral lower extremities          Assessment and Plan     Mr. Kim is a 62 y.o. with " asymptomatic carotid artery stenosis. I personally reviewed the images from his CTA neck obtained which demonstrates RIGHT carotid with severe stenosis and LEFT carotid demonstrates moderate stenosis. I recommend proceeding with a RIGHT CAROTID ENDARTERECTOMY for stroke risk reduction. We discussed the risks and benefits of surgery including but not limited to bleeding, stroke, cranial nerve injury, myocardial infarction, and/or infection. We also discussed the increased risk of stroke without surgical intervention. He wishes to proceed.        1. Bilateral carotid artery stenosis  - Basic Metabolic Panel; Future  - CBC Auto Differential; Future  - EKG 12-lead; Future  - X-Ray Chest PA And Lateral Pre-OP; Future          Imaging Obtained/Reviewed   Study: CTA neck  Date:   10/20/23        Medical History     Past Medical History:   Diagnosis Date    Anxiety     Collar bone fracture     Coronary artery disease     DDD (degenerative disc disease), cervical     HLD (hyperlipidemia)     HTN (hypertension)      Past Surgical History:   Procedure Laterality Date    ANTERIOR CRUCIATE LIGAMENT REPAIR      CARDIAC CATHETERIZATION N/A 11/05/2019    Procedure: LEFT HEART CATH; Surgeon: Saritha Santos MD; Location: [facility] Cardiac [facility]; Service: Cardiovascular; Laterality: N/A;    CORONARY ARTERY BYPASS GRAFT  11/09/2019    Robotically assisted off-pump CABG x1- Dr. VADIM Dumont    KNEE SURGERY Right     SHOULDER ARTHROSCOPY      rotator cuff repair    THORACOSCOPY       History reviewed. No pertinent family history.  Social History     Socioeconomic History    Marital status:    Tobacco Use    Smoking status: Never    Smokeless tobacco: Never     Current Outpatient Medications   Medication Instructions    amlodipine-benazepril 10-20mg (LOTREL) 10-20 mg per capsule 1 capsule, Oral, Every morning    aspirin 81 MG Chew 1 tablet, Oral, Every morning    atorvastatin (LIPITOR) 40 mg, Oral    busPIRone (BUSPAR) 15 MG  tablet Take 1 Table by mouth in the morning, 1 Tablet at noon and one tablet before bedtime    carvediloL (COREG) 12.5 mg, Oral, 2 times daily    clopidogreL (PLAVIX) 75 mg, Oral    ipratropium (ATROVENT) 42 mcg (0.06 %) nasal spray USE 2 SPRAYS IN EACH NOSTRIL IN THE MORNING, 2 SPRAYS AT NOON, 2 SPRAYS IN THE EVENING, AND 2 SPRAYS BEFORE BEDTIME FOR 4 DAYS     Review of patient's allergies indicates:   Allergen Reactions    Hydrochlorothiazide      Other Reaction(s): Unknown    Metoprolol Other (See Comments)       Patient Care Team:  Mook Lerma DO as PCP - General (Family Medicine)  Saritha Santos MD as Referring Physician (Cardiology)  Ace Spicer MD as Referring Physician (Gastroenterology)        No follow-ups on file. In addition to their scheduled follow up, the patient has also been instructed to follow up on as needed basis.     No future appointments.

## 2023-11-16 ENCOUNTER — OFFICE VISIT (OUTPATIENT)
Dept: VASCULAR SURGERY | Facility: CLINIC | Age: 63
End: 2023-11-16
Payer: COMMERCIAL

## 2023-11-16 VITALS
HEIGHT: 69 IN | DIASTOLIC BLOOD PRESSURE: 83 MMHG | WEIGHT: 170 LBS | SYSTOLIC BLOOD PRESSURE: 143 MMHG | BODY MASS INDEX: 25.18 KG/M2 | HEART RATE: 61 BPM

## 2023-11-16 DIAGNOSIS — I65.23 BILATERAL CAROTID ARTERY STENOSIS: Primary | ICD-10-CM

## 2023-11-16 PROCEDURE — 1159F PR MEDICATION LIST DOCUMENTED IN MEDICAL RECORD: ICD-10-PCS | Mod: CPTII,,, | Performed by: SPECIALIST

## 2023-11-16 PROCEDURE — 4010F PR ACE/ARB THEARPY RXD/TAKEN: ICD-10-PCS | Mod: CPTII,,, | Performed by: SPECIALIST

## 2023-11-16 PROCEDURE — 1160F RVW MEDS BY RX/DR IN RCRD: CPT | Mod: CPTII,,, | Performed by: SPECIALIST

## 2023-11-16 PROCEDURE — 99204 PR OFFICE/OUTPT VISIT, NEW, LEVL IV, 45-59 MIN: ICD-10-PCS | Mod: ,,, | Performed by: SPECIALIST

## 2023-11-16 PROCEDURE — 3008F BODY MASS INDEX DOCD: CPT | Mod: CPTII,,, | Performed by: SPECIALIST

## 2023-11-16 PROCEDURE — 3008F PR BODY MASS INDEX (BMI) DOCUMENTED: ICD-10-PCS | Mod: CPTII,,, | Performed by: SPECIALIST

## 2023-11-16 PROCEDURE — 99204 OFFICE O/P NEW MOD 45 MIN: CPT | Mod: ,,, | Performed by: SPECIALIST

## 2023-11-16 PROCEDURE — 1159F MED LIST DOCD IN RCRD: CPT | Mod: CPTII,,, | Performed by: SPECIALIST

## 2023-11-16 PROCEDURE — 3079F DIAST BP 80-89 MM HG: CPT | Mod: CPTII,,, | Performed by: SPECIALIST

## 2023-11-16 PROCEDURE — 3079F PR MOST RECENT DIASTOLIC BLOOD PRESSURE 80-89 MM HG: ICD-10-PCS | Mod: CPTII,,, | Performed by: SPECIALIST

## 2023-11-16 PROCEDURE — 4010F ACE/ARB THERAPY RXD/TAKEN: CPT | Mod: CPTII,,, | Performed by: SPECIALIST

## 2023-11-16 PROCEDURE — 3077F PR MOST RECENT SYSTOLIC BLOOD PRESSURE >= 140 MM HG: ICD-10-PCS | Mod: CPTII,,, | Performed by: SPECIALIST

## 2023-11-16 PROCEDURE — 1160F PR REVIEW ALL MEDS BY PRESCRIBER/CLIN PHARMACIST DOCUMENTED: ICD-10-PCS | Mod: CPTII,,, | Performed by: SPECIALIST

## 2023-11-16 PROCEDURE — 3077F SYST BP >= 140 MM HG: CPT | Mod: CPTII,,, | Performed by: SPECIALIST

## 2023-11-16 RX ORDER — SODIUM CHLORIDE 9 MG/ML
INJECTION, SOLUTION INTRAVENOUS CONTINUOUS
Status: CANCELLED | OUTPATIENT
Start: 2023-11-16

## 2023-11-16 RX ORDER — CLOPIDOGREL BISULFATE 75 MG/1
75 TABLET ORAL
COMMUNITY
Start: 2023-10-04

## 2023-11-16 RX ORDER — CARVEDILOL 12.5 MG/1
12.5 TABLET ORAL 2 TIMES DAILY
COMMUNITY
Start: 2023-09-17

## 2023-11-16 RX ORDER — ATORVASTATIN CALCIUM 40 MG/1
40 TABLET, FILM COATED ORAL DAILY
COMMUNITY
Start: 2023-10-04

## 2023-11-17 RX ORDER — HYDROCODONE BITARTRATE AND ACETAMINOPHEN 7.5; 325 MG/1; MG/1
1 TABLET ORAL EVERY 6 HOURS PRN
Qty: 28 TABLET | Refills: 0 | Status: SHIPPED | OUTPATIENT
Start: 2023-11-17 | End: 2024-01-16

## 2023-11-24 ENCOUNTER — HOSPITAL ENCOUNTER (OUTPATIENT)
Dept: RADIOLOGY | Facility: HOSPITAL | Age: 63
Discharge: HOME | End: 2023-11-24
Attending: SPECIALIST
Payer: COMMERCIAL

## 2023-11-24 DIAGNOSIS — I65.23 BILATERAL CAROTID ARTERY STENOSIS: ICD-10-CM

## 2023-11-24 PROCEDURE — 71046 X-RAY EXAM CHEST 2 VIEWS: CPT | Mod: TC

## 2023-12-07 RX ORDER — SERTRALINE HYDROCHLORIDE 50 MG/1
1 TABLET, FILM COATED ORAL EVERY MORNING
COMMUNITY
Start: 2023-11-20 | End: 2023-12-21

## 2023-12-07 RX ORDER — METHYLPREDNISOLONE 4 MG/1
1 TABLET ORAL
COMMUNITY
Start: 2023-08-29 | End: 2024-01-16

## 2023-12-19 RX ORDER — HYDROGEN PEROXIDE 3 %
20 SOLUTION, NON-ORAL MISCELLANEOUS
COMMUNITY

## 2023-12-19 NOTE — H&P (VIEW-ONLY)
"    [facility] Vascular - Clinic Note  Odette Nicholas MD      Patient Name: Flip Kim                   : 1960      MRN: [id number]   Visit Date: 2023       History Present Illness     Reason for Visit: Carotid Artery Disease -H&P update for surgery    Mr. Kim presents to the clinic for history and physical update in preparation for right carotid endarterectomy. He was originally referred by Dr. Santos and remains asymptomatic. Previous CABG in 2019. Remains with some dizziness with position change. No changes in medical history since last visit.      REVIEW OF SYSTEMS:  12 point review of systems conducted, negative except as stated in the history of present illness. See HPI for details.        Physical Exam      Vitals:    23 0937 23 0938   BP: 122/78 (!) 149/95   BP Location: Left arm Right arm   Pulse: 63 76   Weight: 77.1 kg (170 lb)    Height: 5' 9" (1.753 m)           General: well-nourished, no acute distress, and healthy appearing, ambulating normally, alert, pleasant, conversant, and oriented  Neurologic: cranial nerves are grossly intact marginal mandibular of the facial nerve and hypoglossal nerve demonstrates appropriate function, no neurologic deficits, no motor deficits, and no sensory deficits  HEENT: grossly normal and no scleral icterus  Neck/Chest: normal  and soft without lymphadenopathy  Respiratory: breathing easily and without respiratory distress  Abdomen: normal and soft  Cardiology: regular rate and rhythm    Upper Extremity Arterial Exam:   Right - radial is palpable  Left - radial is palpable      Musculoskeletal:   Upper Extremity: normal bilateral hand function and normal bilateral hand sensation, hands are warm bilaterally  Lower Extremity: no edema present to bilateral lower extremities            Assessment and Plan     Mr. Kim is a 63 y.o. with asymptomatic carotid artery stenosis. I personally reviewed the images from his CTA neck which " demonstrates RIGHT carotid with severe stenosis and LEFT carotid demonstrates moderate stenosis. I recommend proceeding with a RIGHT CAROTID ENDARTERECTOMY for stroke risk reduction. We reviewed the risks and benefits of surgery including but not limited to bleeding, stroke, cranial nerve injury, myocardial infarction, and/or infection. We also discussed the increased risk of stroke without surgical intervention. He is ready to proceed.       1. Bilateral carotid artery stenosis  - Basic Metabolic Panel; Future  - CBC Auto Differential; Future  - EKG 12-lead; Future  - X-Ray Chest PA And Lateral Pre-OP; Future          Imaging Obtained/Reviewed   Study: CTA neck  Date:   10/20/23        Medical History     Past Medical History:   Diagnosis Date    Anxiety     Bilateral carotid artery stenosis     Collar bone fracture     Coronary artery disease     s/p single bypass - 2019    DDD (degenerative disc disease), cervical     Digestive disorder     acid reflux    History of heart attack 2019    HLD (hyperlipidemia)     HTN (hypertension)      Past Surgical History:   Procedure Laterality Date    ANTERIOR CRUCIATE LIGAMENT REPAIR Right 1990    CARDIAC CATHETERIZATION N/A 11/05/2019    Procedure: LEFT HEART CATH; Surgeon: Saritha Santos MD; Location: [facility] Cardiac Cath Labs; Service: Cardiovascular; Laterality: N/A;    COLONOSCOPY      CORONARY ARTERY BYPASS GRAFT  11/09/2019    Robotically assisted off-pump CABG x1- Dr. VADIM Dumont    KNEE SURGERY Right     SHOULDER ARTHROSCOPY Right     rotator cuff repair    THORACOSCOPY      UPPER GASTROINTESTINAL ENDOSCOPY       History reviewed. No pertinent family history.  Social History     Socioeconomic History    Marital status:    Tobacco Use    Smoking status: Never    Smokeless tobacco: Never   Substance and Sexual Activity    Alcohol use: Never    Drug use: Never     Current Outpatient Medications   Medication Instructions    amlodipine-benazepril 10-20mg (LOTREL)  10-20 mg per capsule 1 capsule, Oral, Every morning    aspirin 81 MG Chew 1 tablet, Oral, Every morning    atorvastatin (LIPITOR) 40 mg, Oral, Daily    busPIRone (BUSPAR) 15 MG tablet Take 1 Table by mouth in the morning, 1 Tablet at noon and one tablet before bedtime    carvediloL (COREG) 12.5 mg, Oral, 2 times daily    clopidogreL (PLAVIX) 75 mg, Oral    esomeprazole (NEXIUM) 20 mg, Oral, Before breakfast    HYDROcodone-acetaminophen (NORCO) 7.5-325 mg per tablet 1 tablet, Oral, Every 6 hours PRN    ipratropium (ATROVENT) 42 mcg (0.06 %) nasal spray USE 2 SPRAYS IN EACH NOSTRIL IN THE MORNING, 2 SPRAYS AT NOON, 2 SPRAYS IN THE EVENING, AND 2 SPRAYS BEFORE BEDTIME FOR 4 DAYS    MEDROL, PHYLLIS, 4 mg tablet 1 tablet, Oral    sertraline (ZOLOFT) 50 MG tablet 1 tablet, Oral, Every morning     Review of patient's allergies indicates:   Allergen Reactions    Hydrochlorothiazide      Other Reaction(s): Unknown    Metoprolol Other (See Comments)       Patient Care Team:  Mook Lerma DO as PCP - General (Family Medicine)  Saritha Santos MD as Referring Physician (Cardiology)  Ace Spicer MD as Referring Physician (Gastroenterology)        No follow-ups on file. In addition to their scheduled follow up, the patient has also been instructed to follow up on as needed basis.     No future appointments.

## 2023-12-19 NOTE — PROGRESS NOTES
"    [facility] Vascular - Clinic Note  Odette Nicholas MD      Patient Name: Flip Kim                   : 1960      MRN: [id number]   Visit Date: 2023       History Present Illness     Reason for Visit: Carotid Artery Disease -H&P update for surgery    Mr. Kim presents to the clinic for history and physical update in preparation for right carotid endarterectomy. He was originally referred by Dr. Santos and remains asymptomatic. Previous CABG in 2019. Remains with some dizziness with position change. No changes in medical history since last visit.      REVIEW OF SYSTEMS:  12 point review of systems conducted, negative except as stated in the history of present illness. See HPI for details.        Physical Exam      Vitals:    23 0937 23 0938   BP: 122/78 (!) 149/95   BP Location: Left arm Right arm   Pulse: 63 76   Weight: 77.1 kg (170 lb)    Height: 5' 9" (1.753 m)           General: well-nourished, no acute distress, and healthy appearing, ambulating normally, alert, pleasant, conversant, and oriented  Neurologic: cranial nerves are grossly intact marginal mandibular of the facial nerve and hypoglossal nerve demonstrates appropriate function, no neurologic deficits, no motor deficits, and no sensory deficits  HEENT: grossly normal and no scleral icterus  Neck/Chest: normal  and soft without lymphadenopathy  Respiratory: breathing easily and without respiratory distress  Abdomen: normal and soft  Cardiology: regular rate and rhythm    Upper Extremity Arterial Exam:   Right - radial is palpable  Left - radial is palpable      Musculoskeletal:   Upper Extremity: normal bilateral hand function and normal bilateral hand sensation, hands are warm bilaterally  Lower Extremity: no edema present to bilateral lower extremities            Assessment and Plan     Mr. Kim is a 63 y.o. with asymptomatic carotid artery stenosis. I personally reviewed the images from his CTA neck which " demonstrates RIGHT carotid with severe stenosis and LEFT carotid demonstrates moderate stenosis. I recommend proceeding with a RIGHT CAROTID ENDARTERECTOMY for stroke risk reduction. We reviewed the risks and benefits of surgery including but not limited to bleeding, stroke, cranial nerve injury, myocardial infarction, and/or infection. We also discussed the increased risk of stroke without surgical intervention. He is ready to proceed.       1. Bilateral carotid artery stenosis  - Basic Metabolic Panel; Future  - CBC Auto Differential; Future  - EKG 12-lead; Future  - X-Ray Chest PA And Lateral Pre-OP; Future          Imaging Obtained/Reviewed   Study: CTA neck  Date:   10/20/23        Medical History     Past Medical History:   Diagnosis Date    Anxiety     Bilateral carotid artery stenosis     Collar bone fracture     Coronary artery disease     s/p single bypass - 2019    DDD (degenerative disc disease), cervical     Digestive disorder     acid reflux    History of heart attack 2019    HLD (hyperlipidemia)     HTN (hypertension)      Past Surgical History:   Procedure Laterality Date    ANTERIOR CRUCIATE LIGAMENT REPAIR Right 1990    CARDIAC CATHETERIZATION N/A 11/05/2019    Procedure: LEFT HEART CATH; Surgeon: Saritha Santos MD; Location: [facility] Cardiac Cath Labs; Service: Cardiovascular; Laterality: N/A;    COLONOSCOPY      CORONARY ARTERY BYPASS GRAFT  11/09/2019    Robotically assisted off-pump CABG x1- Dr. VADIM Dumont    KNEE SURGERY Right     SHOULDER ARTHROSCOPY Right     rotator cuff repair    THORACOSCOPY      UPPER GASTROINTESTINAL ENDOSCOPY       History reviewed. No pertinent family history.  Social History     Socioeconomic History    Marital status:    Tobacco Use    Smoking status: Never    Smokeless tobacco: Never   Substance and Sexual Activity    Alcohol use: Never    Drug use: Never     Current Outpatient Medications   Medication Instructions    amlodipine-benazepril 10-20mg (LOTREL)  10-20 mg per capsule 1 capsule, Oral, Every morning    aspirin 81 MG Chew 1 tablet, Oral, Every morning    atorvastatin (LIPITOR) 40 mg, Oral, Daily    busPIRone (BUSPAR) 15 MG tablet Take 1 Table by mouth in the morning, 1 Tablet at noon and one tablet before bedtime    carvediloL (COREG) 12.5 mg, Oral, 2 times daily    clopidogreL (PLAVIX) 75 mg, Oral    esomeprazole (NEXIUM) 20 mg, Oral, Before breakfast    HYDROcodone-acetaminophen (NORCO) 7.5-325 mg per tablet 1 tablet, Oral, Every 6 hours PRN    ipratropium (ATROVENT) 42 mcg (0.06 %) nasal spray USE 2 SPRAYS IN EACH NOSTRIL IN THE MORNING, 2 SPRAYS AT NOON, 2 SPRAYS IN THE EVENING, AND 2 SPRAYS BEFORE BEDTIME FOR 4 DAYS    MEDROL, PHYLLIS, 4 mg tablet 1 tablet, Oral    sertraline (ZOLOFT) 50 MG tablet 1 tablet, Oral, Every morning     Review of patient's allergies indicates:   Allergen Reactions    Hydrochlorothiazide      Other Reaction(s): Unknown    Metoprolol Other (See Comments)       Patient Care Team:  Mook Lerma DO as PCP - General (Family Medicine)  Saritha Santos MD as Referring Physician (Cardiology)  Ace Spicer MD as Referring Physician (Gastroenterology)        No follow-ups on file. In addition to their scheduled follow up, the patient has also been instructed to follow up on as needed basis.     No future appointments.

## 2023-12-21 ENCOUNTER — OFFICE VISIT (OUTPATIENT)
Dept: VASCULAR SURGERY | Facility: CLINIC | Age: 63
End: 2023-12-21
Payer: COMMERCIAL

## 2023-12-21 ENCOUNTER — HOSPITAL ENCOUNTER (OUTPATIENT)
Dept: RADIOLOGY | Facility: HOSPITAL | Age: 63
Discharge: HOME | End: 2023-12-21
Attending: SPECIALIST
Payer: COMMERCIAL

## 2023-12-21 VITALS
SYSTOLIC BLOOD PRESSURE: 149 MMHG | DIASTOLIC BLOOD PRESSURE: 95 MMHG | HEART RATE: 76 BPM | BODY MASS INDEX: 25.18 KG/M2 | WEIGHT: 170 LBS | HEIGHT: 69 IN

## 2023-12-21 DIAGNOSIS — I65.23 BILATERAL CAROTID ARTERY STENOSIS: Primary | ICD-10-CM

## 2023-12-21 DIAGNOSIS — I65.23 BILATERAL CAROTID ARTERY STENOSIS: ICD-10-CM

## 2023-12-21 PROCEDURE — 3077F PR MOST RECENT SYSTOLIC BLOOD PRESSURE >= 140 MM HG: ICD-10-PCS | Mod: CPTII,,, | Performed by: SPECIALIST

## 2023-12-21 PROCEDURE — 3008F PR BODY MASS INDEX (BMI) DOCUMENTED: ICD-10-PCS | Mod: CPTII,,, | Performed by: SPECIALIST

## 2023-12-21 PROCEDURE — 71046 X-RAY EXAM CHEST 2 VIEWS: CPT | Mod: TC

## 2023-12-21 PROCEDURE — 1160F PR REVIEW ALL MEDS BY PRESCRIBER/CLIN PHARMACIST DOCUMENTED: ICD-10-PCS | Mod: CPTII,,, | Performed by: SPECIALIST

## 2023-12-21 PROCEDURE — 99213 PR OFFICE/OUTPT VISIT, EST, LEVL III, 20-29 MIN: ICD-10-PCS | Mod: ,,, | Performed by: SPECIALIST

## 2023-12-21 PROCEDURE — 1159F MED LIST DOCD IN RCRD: CPT | Mod: CPTII,,, | Performed by: SPECIALIST

## 2023-12-21 PROCEDURE — 3077F SYST BP >= 140 MM HG: CPT | Mod: CPTII,,, | Performed by: SPECIALIST

## 2023-12-21 PROCEDURE — 4010F PR ACE/ARB THEARPY RXD/TAKEN: ICD-10-PCS | Mod: CPTII,,, | Performed by: SPECIALIST

## 2023-12-21 PROCEDURE — 4010F ACE/ARB THERAPY RXD/TAKEN: CPT | Mod: CPTII,,, | Performed by: SPECIALIST

## 2023-12-21 PROCEDURE — 3080F DIAST BP >= 90 MM HG: CPT | Mod: CPTII,,, | Performed by: SPECIALIST

## 2023-12-21 PROCEDURE — 3008F BODY MASS INDEX DOCD: CPT | Mod: CPTII,,, | Performed by: SPECIALIST

## 2023-12-21 PROCEDURE — 3080F PR MOST RECENT DIASTOLIC BLOOD PRESSURE >= 90 MM HG: ICD-10-PCS | Mod: CPTII,,, | Performed by: SPECIALIST

## 2023-12-21 PROCEDURE — 99213 OFFICE O/P EST LOW 20 MIN: CPT | Mod: ,,, | Performed by: SPECIALIST

## 2023-12-21 PROCEDURE — 1160F RVW MEDS BY RX/DR IN RCRD: CPT | Mod: CPTII,,, | Performed by: SPECIALIST

## 2023-12-21 PROCEDURE — 1159F PR MEDICATION LIST DOCUMENTED IN MEDICAL RECORD: ICD-10-PCS | Mod: CPTII,,, | Performed by: SPECIALIST

## 2023-12-28 ENCOUNTER — ANESTHESIA EVENT (OUTPATIENT)
Dept: SURGERY | Facility: HOSPITAL | Age: 63
DRG: 039 | End: 2023-12-28
Payer: COMMERCIAL

## 2024-01-02 NOTE — PRE-PROCEDURE INSTRUCTIONS
"[facility]: Outpatient Surgery  Preprocedure Instructions    Your physician's office will be calling you with your arrival time.      Expectations: "Because of inconsistent procedure completion times, an unexpected wait may occur. The Physicians would like you to be here to prepare in the event they run ahead of time. We will make you as comfortable as possible and keep you informed. We apologize in advance if this happens."    You will arrive at [facility], [address], [place].  Enter through the [place] entrance next to the Emergency Room, and come to the 6th floor to the Outpatient Surgery Department.     Visitory Policy:  You are allowed 2 adult visitors to be with you in the hospital. Please, no switching visitors in pre-op area. All hospital visitors should be in good current health.  No small children.     What to Bring:  Please have your ID, insurance cards, and all home medication bottles with you at check in.  Bring your CPAP machine if one is used at home.     Fasting:  Nothing to eat or drink after midnight the night before your procedure. This includes no ice, gum, hard candies, and/or tobacco products.    Medications:  Follow your doctor's instructions for taking any medications on the morning of your procedure.  If no instructions for taking medications were given, do not take any medications but bring your medications in their bottles to your procedure check in.     Follow your doctor's preoperative instructions regarding skin prep, bowel prep, bathing, or showering prior to your procedure.  If any special soaps were provided to you, please use according to your doctor's instructions. If no instructions were given from your doctor, take a good bath or shower with antibacterial soap the night before and the morning of your procedure.  On the morning of procedure, wear loose, comfortable clothing.  No lotions, makeup, perfumes, colognes, deodorant, or " jewelry to your procedure.  Removable items (glasses, contact lenses, dentures, retainers, hearing aids) need to be removed for your procedure.  Bring your storage containers for these items if you wear them.     Artificial nails, body jewelry, eyelash extensions, and/or hair extensions with metal clips are not allowed during your surgery.  If you currently wear any of these items, please arrange for them to be removed prior to your arrival to the hospital.     Outpatient or Same Day Surgeries:  Any patients receiving sedation/anesthesia are advised not to drive for 24 hours after their procedure.  We do not allow patients to drive themselves home after discharge.  If you are going home after your procedure, please have someone available to drive you home from the hospital.        You may call the Outpatient Surgery Department at [phone] with any questions or concerns.  We are looking forward to meeting you and taking great care of you for your procedure.  Thank you for choosing [facility] for your surgical needs.

## 2024-01-03 ENCOUNTER — HOSPITAL ENCOUNTER (INPATIENT)
Facility: HOSPITAL | Age: 64
LOS: 1 days | Discharge: HOME | DRG: 039 | End: 2024-01-04
Attending: SPECIALIST | Admitting: SPECIALIST
Payer: COMMERCIAL

## 2024-01-03 ENCOUNTER — ANESTHESIA (OUTPATIENT)
Dept: SURGERY | Facility: HOSPITAL | Age: 64
DRG: 039 | End: 2024-01-03
Payer: COMMERCIAL

## 2024-01-03 DIAGNOSIS — I65.23 BILATERAL CAROTID ARTERY STENOSIS: ICD-10-CM

## 2024-01-03 LAB
ABORH RETYPE: NORMAL
GROUP & RH: NORMAL
INDIRECT COOMBS: NORMAL
SPECIMEN OUTDATE: NORMAL

## 2024-01-03 PROCEDURE — 25000003 PHARM REV CODE 250: Performed by: SPECIALIST

## 2024-01-03 PROCEDURE — 25000003 PHARM REV CODE 250: Performed by: NURSE ANESTHETIST, CERTIFIED REGISTERED

## 2024-01-03 PROCEDURE — 11000001 HC ACUTE MED/SURG PRIVATE ROOM

## 2024-01-03 PROCEDURE — 03CK0ZZ EXTIRPATION OF MATTER FROM RIGHT INTERNAL CAROTID ARTERY, OPEN APPROACH: ICD-10-PCS | Performed by: SPECIALIST

## 2024-01-03 PROCEDURE — 71000033 HC RECOVERY, INTIAL HOUR: Performed by: SPECIALIST

## 2024-01-03 PROCEDURE — 63600175 PHARM REV CODE 636 W HCPCS: Performed by: NURSE ANESTHETIST, CERTIFIED REGISTERED

## 2024-01-03 PROCEDURE — 86901 BLOOD TYPING SEROLOGIC RH(D): CPT | Performed by: SPECIALIST

## 2024-01-03 PROCEDURE — 63600175 PHARM REV CODE 636 W HCPCS: Performed by: SPECIALIST

## 2024-01-03 PROCEDURE — C1768 GRAFT, VASCULAR: HCPCS | Performed by: SPECIALIST

## 2024-01-03 PROCEDURE — D9220A PRA ANESTHESIA: Mod: CRNA,,, | Performed by: NURSE ANESTHETIST, CERTIFIED REGISTERED

## 2024-01-03 PROCEDURE — 71000039 HC RECOVERY, EACH ADD'L HOUR: Performed by: SPECIALIST

## 2024-01-03 PROCEDURE — D9220A PRA ANESTHESIA: Mod: ANES,,, | Performed by: ANESTHESIOLOGY

## 2024-01-03 PROCEDURE — 37000009 HC ANESTHESIA EA ADD 15 MINS: Performed by: SPECIALIST

## 2024-01-03 PROCEDURE — 36620 INSERTION CATHETER ARTERY: CPT | Performed by: NURSE ANESTHETIST, CERTIFIED REGISTERED

## 2024-01-03 PROCEDURE — 36000707: Performed by: SPECIALIST

## 2024-01-03 PROCEDURE — 03UK0KZ SUPPLEMENT RIGHT INTERNAL CAROTID ARTERY WITH NONAUTOLOGOUS TISSUE SUBSTITUTE, OPEN APPROACH: ICD-10-PCS | Performed by: SPECIALIST

## 2024-01-03 PROCEDURE — 27201423 OPTIME MED/SURG SUP & DEVICES STERILE SUPPLY: Performed by: SPECIALIST

## 2024-01-03 PROCEDURE — 03SM0ZZ REPOSITION RIGHT EXTERNAL CAROTID ARTERY, OPEN APPROACH: ICD-10-PCS | Performed by: SPECIALIST

## 2024-01-03 PROCEDURE — 35301 RECHANNELING OF ARTERY: CPT | Mod: RT,,, | Performed by: SPECIALIST

## 2024-01-03 PROCEDURE — 63600175 PHARM REV CODE 636 W HCPCS: Performed by: ANESTHESIOLOGY

## 2024-01-03 PROCEDURE — 35261 RPR BLVSL GRF OTH/THN VN NCK: CPT | Mod: 59,RT,, | Performed by: SPECIALIST

## 2024-01-03 PROCEDURE — 71000015 HC POSTOP RECOV 1ST HR: Performed by: SPECIALIST

## 2024-01-03 PROCEDURE — 03RK0JZ REPLACEMENT OF RIGHT INTERNAL CAROTID ARTERY WITH SYNTHETIC SUBSTITUTE, OPEN APPROACH: ICD-10-PCS | Performed by: SPECIALIST

## 2024-01-03 PROCEDURE — 37000008 HC ANESTHESIA 1ST 15 MINUTES: Performed by: SPECIALIST

## 2024-01-03 PROCEDURE — 36000706: Performed by: SPECIALIST

## 2024-01-03 PROCEDURE — 21400001 HC TELEMETRY ROOM

## 2024-01-03 PROCEDURE — 03RH0JZ REPLACEMENT OF RIGHT COMMON CAROTID ARTERY WITH SYNTHETIC SUBSTITUTE, OPEN APPROACH: ICD-10-PCS | Performed by: SPECIALIST

## 2024-01-03 DEVICE — PROPATEN VASCULAR GRAFT SW 4-7MMX45CM TAPERED HEPARIN
Type: IMPLANTABLE DEVICE | Site: CAROTID | Status: FUNCTIONAL
Brand: GORE PROPATEN VASCULAR GRAFT

## 2024-01-03 RX ORDER — IPRATROPIUM BROMIDE 42 UG/1
2 SPRAY, METERED NASAL 3 TIMES DAILY
Status: DISCONTINUED | OUTPATIENT
Start: 2024-01-03 | End: 2024-01-04 | Stop reason: HOSPADM

## 2024-01-03 RX ORDER — CEFAZOLIN SODIUM/WATER 2 G/20 ML
SYRINGE (ML) INTRAVENOUS
Status: DISCONTINUED | OUTPATIENT
Start: 2024-01-03 | End: 2024-01-03

## 2024-01-03 RX ORDER — EPHEDRINE SULFATE 50 MG/ML
INJECTION, SOLUTION INTRAVENOUS
Status: DISCONTINUED | OUTPATIENT
Start: 2024-01-03 | End: 2024-01-03

## 2024-01-03 RX ORDER — CEFAZOLIN SODIUM 2 G/50ML
2 SOLUTION INTRAVENOUS
Status: COMPLETED | OUTPATIENT
Start: 2024-01-03 | End: 2024-01-04

## 2024-01-03 RX ORDER — CEFAZOLIN SODIUM 2 G/50ML
2 SOLUTION INTRAVENOUS
Status: DISCONTINUED | OUTPATIENT
Start: 2024-01-03 | End: 2024-01-03

## 2024-01-03 RX ORDER — FENTANYL CITRATE 50 UG/ML
INJECTION, SOLUTION INTRAMUSCULAR; INTRAVENOUS
Status: DISCONTINUED | OUTPATIENT
Start: 2024-01-03 | End: 2024-01-03

## 2024-01-03 RX ORDER — CEFAZOLIN SODIUM 1 G/3ML
INJECTION, POWDER, FOR SOLUTION INTRAMUSCULAR; INTRAVENOUS
Status: DISCONTINUED | OUTPATIENT
Start: 2024-01-03 | End: 2024-01-03 | Stop reason: HOSPADM

## 2024-01-03 RX ORDER — MIDAZOLAM HYDROCHLORIDE 1 MG/ML
INJECTION INTRAMUSCULAR; INTRAVENOUS
Status: DISCONTINUED | OUTPATIENT
Start: 2024-01-03 | End: 2024-01-03

## 2024-01-03 RX ORDER — ATORVASTATIN CALCIUM 40 MG/1
40 TABLET, FILM COATED ORAL DAILY
Status: DISCONTINUED | OUTPATIENT
Start: 2024-01-03 | End: 2024-01-04 | Stop reason: HOSPADM

## 2024-01-03 RX ORDER — HEPARIN 100 UNIT/ML
500 SYRINGE INTRAVENOUS
Status: DISCONTINUED | OUTPATIENT
Start: 2024-01-03 | End: 2024-01-04 | Stop reason: HOSPADM

## 2024-01-03 RX ORDER — SODIUM CHLORIDE 9 MG/ML
INJECTION, SOLUTION INTRAVENOUS CONTINUOUS
Status: DISCONTINUED | OUTPATIENT
Start: 2024-01-03 | End: 2024-01-03

## 2024-01-03 RX ORDER — ACETAMINOPHEN 10 MG/ML
INJECTION, SOLUTION INTRAVENOUS
Status: DISCONTINUED | OUTPATIENT
Start: 2024-01-03 | End: 2024-01-03

## 2024-01-03 RX ORDER — PROTAMINE SULFATE 10 MG/ML
INJECTION, SOLUTION INTRAVENOUS
Status: DISCONTINUED | OUTPATIENT
Start: 2024-01-03 | End: 2024-01-03

## 2024-01-03 RX ORDER — AMLODIPINE BESYLATE 5 MG/1
10 TABLET ORAL DAILY
Status: DISCONTINUED | OUTPATIENT
Start: 2024-01-03 | End: 2024-01-04 | Stop reason: HOSPADM

## 2024-01-03 RX ORDER — MUPIROCIN 20 MG/G
OINTMENT TOPICAL 2 TIMES DAILY
Status: DISCONTINUED | OUTPATIENT
Start: 2024-01-03 | End: 2024-01-04 | Stop reason: HOSPADM

## 2024-01-03 RX ORDER — HYDRALAZINE HYDROCHLORIDE 20 MG/ML
10 INJECTION INTRAMUSCULAR; INTRAVENOUS
Status: DISCONTINUED | OUTPATIENT
Start: 2024-01-03 | End: 2024-01-04 | Stop reason: HOSPADM

## 2024-01-03 RX ORDER — HEPARIN SODIUM 1000 [USP'U]/ML
INJECTION, SOLUTION INTRAVENOUS; SUBCUTANEOUS
Status: DISCONTINUED | OUTPATIENT
Start: 2024-01-03 | End: 2024-01-03

## 2024-01-03 RX ORDER — PHENYLEPHRINE HYDROCHLORIDE 10 MG/ML
INJECTION INTRAVENOUS
Status: DISCONTINUED | OUTPATIENT
Start: 2024-01-03 | End: 2024-01-03

## 2024-01-03 RX ORDER — BUSPIRONE HYDROCHLORIDE 5 MG/1
15 TABLET ORAL 2 TIMES DAILY
Status: DISCONTINUED | OUTPATIENT
Start: 2024-01-03 | End: 2024-01-04 | Stop reason: HOSPADM

## 2024-01-03 RX ORDER — HYDROMORPHONE HYDROCHLORIDE 2 MG/ML
0.5 INJECTION, SOLUTION INTRAMUSCULAR; INTRAVENOUS; SUBCUTANEOUS EVERY 4 HOURS PRN
Status: DISCONTINUED | OUTPATIENT
Start: 2024-01-03 | End: 2024-01-04 | Stop reason: HOSPADM

## 2024-01-03 RX ORDER — GLYCOPYRROLATE 0.2 MG/ML
INJECTION INTRAMUSCULAR; INTRAVENOUS
Status: DISCONTINUED | OUTPATIENT
Start: 2024-01-03 | End: 2024-01-03

## 2024-01-03 RX ORDER — CARVEDILOL 12.5 MG/1
12.5 TABLET ORAL 2 TIMES DAILY
Status: DISCONTINUED | OUTPATIENT
Start: 2024-01-03 | End: 2024-01-04 | Stop reason: HOSPADM

## 2024-01-03 RX ORDER — HYDROMORPHONE HYDROCHLORIDE 2 MG/ML
0.4 INJECTION, SOLUTION INTRAMUSCULAR; INTRAVENOUS; SUBCUTANEOUS EVERY 5 MIN PRN
Status: DISCONTINUED | OUTPATIENT
Start: 2024-01-03 | End: 2024-01-03 | Stop reason: HOSPADM

## 2024-01-03 RX ORDER — HYDROCODONE BITARTRATE AND ACETAMINOPHEN 10; 325 MG/1; MG/1
1 TABLET ORAL EVERY 4 HOURS PRN
Status: DISCONTINUED | OUTPATIENT
Start: 2024-01-03 | End: 2024-01-04 | Stop reason: HOSPADM

## 2024-01-03 RX ORDER — ONDANSETRON 2 MG/ML
INJECTION INTRAMUSCULAR; INTRAVENOUS
Status: DISCONTINUED | OUTPATIENT
Start: 2024-01-03 | End: 2024-01-03

## 2024-01-03 RX ORDER — ONDANSETRON 2 MG/ML
4 INJECTION INTRAMUSCULAR; INTRAVENOUS EVERY 12 HOURS PRN
Status: DISCONTINUED | OUTPATIENT
Start: 2024-01-03 | End: 2024-01-03

## 2024-01-03 RX ORDER — DEXAMETHASONE SODIUM PHOSPHATE 4 MG/ML
INJECTION, SOLUTION INTRA-ARTICULAR; INTRALESIONAL; INTRAMUSCULAR; INTRAVENOUS; SOFT TISSUE
Status: DISCONTINUED | OUTPATIENT
Start: 2024-01-03 | End: 2024-01-03

## 2024-01-03 RX ORDER — PROPOFOL 10 MG/ML
VIAL (ML) INTRAVENOUS
Status: DISCONTINUED | OUTPATIENT
Start: 2024-01-03 | End: 2024-01-03

## 2024-01-03 RX ORDER — ONDANSETRON 2 MG/ML
4 INJECTION INTRAMUSCULAR; INTRAVENOUS EVERY 4 HOURS PRN
Status: DISCONTINUED | OUTPATIENT
Start: 2024-01-03 | End: 2024-01-04 | Stop reason: HOSPADM

## 2024-01-03 RX ORDER — HYDROCODONE BITARTRATE AND ACETAMINOPHEN 5; 325 MG/1; MG/1
1 TABLET ORAL EVERY 4 HOURS PRN
Status: DISCONTINUED | OUTPATIENT
Start: 2024-01-03 | End: 2024-01-04 | Stop reason: HOSPADM

## 2024-01-03 RX ORDER — SODIUM CHLORIDE 0.9 % (FLUSH) 0.9 %
10 SYRINGE (ML) INJECTION
Status: DISCONTINUED | OUTPATIENT
Start: 2024-01-03 | End: 2024-01-03 | Stop reason: HOSPADM

## 2024-01-03 RX ORDER — GUAIFENESIN 100 MG/5ML
81 LIQUID (ML) ORAL EVERY MORNING
Status: DISCONTINUED | OUTPATIENT
Start: 2024-01-04 | End: 2024-01-04 | Stop reason: HOSPADM

## 2024-01-03 RX ORDER — HEPARIN SODIUM 5000 [USP'U]/ML
INJECTION, SOLUTION INTRAVENOUS; SUBCUTANEOUS
Status: DISCONTINUED | OUTPATIENT
Start: 2024-01-03 | End: 2024-01-03 | Stop reason: HOSPADM

## 2024-01-03 RX ORDER — ROCURONIUM BROMIDE 10 MG/ML
INJECTION, SOLUTION INTRAVENOUS
Status: DISCONTINUED | OUTPATIENT
Start: 2024-01-03 | End: 2024-01-03

## 2024-01-03 RX ADMIN — ROCURONIUM BROMIDE 15 MG: 10 SOLUTION INTRAVENOUS at 11:01

## 2024-01-03 RX ADMIN — PHENYLEPHRINE HYDROCHLORIDE 100 MCG: 10 INJECTION INTRAVENOUS at 11:01

## 2024-01-03 RX ADMIN — PHENYLEPHRINE HYDROCHLORIDE 100 MCG: 10 INJECTION INTRAVENOUS at 10:01

## 2024-01-03 RX ADMIN — ROCURONIUM BROMIDE 60 MG: 10 SOLUTION INTRAVENOUS at 10:01

## 2024-01-03 RX ADMIN — HYDROCODONE BITARTRATE AND ACETAMINOPHEN 1 TABLET: 10; 325 TABLET ORAL at 08:01

## 2024-01-03 RX ADMIN — BUSPIRONE HYDROCHLORIDE 15 MG: 5 TABLET ORAL at 08:01

## 2024-01-03 RX ADMIN — EPHEDRINE SULFATE 10 MG: 50 INJECTION INTRAVENOUS at 02:01

## 2024-01-03 RX ADMIN — SUGAMMADEX 200 MG: 100 INJECTION, SOLUTION INTRAVENOUS at 02:01

## 2024-01-03 RX ADMIN — ONDANSETRON 4 MG: 2 INJECTION INTRAMUSCULAR; INTRAVENOUS at 02:01

## 2024-01-03 RX ADMIN — FENTANYL CITRATE 25 MCG: 50 INJECTION, SOLUTION INTRAMUSCULAR; INTRAVENOUS at 01:01

## 2024-01-03 RX ADMIN — ACETAMINOPHEN 1000 MG: 10 INJECTION, SOLUTION INTRAVENOUS at 10:01

## 2024-01-03 RX ADMIN — MIDAZOLAM HYDROCHLORIDE 2 MG: 1 INJECTION, SOLUTION INTRAMUSCULAR; INTRAVENOUS at 10:01

## 2024-01-03 RX ADMIN — PHENYLEPHRINE HYDROCHLORIDE 50 MCG: 10 INJECTION INTRAVENOUS at 11:01

## 2024-01-03 RX ADMIN — PHENYLEPHRINE HYDROCHLORIDE 50 MCG: 10 INJECTION INTRAVENOUS at 12:01

## 2024-01-03 RX ADMIN — ROCURONIUM BROMIDE 20 MG: 10 SOLUTION INTRAVENOUS at 01:01

## 2024-01-03 RX ADMIN — EPHEDRINE SULFATE 5 MG: 50 INJECTION INTRAVENOUS at 01:01

## 2024-01-03 RX ADMIN — FENTANYL CITRATE 100 MCG: 50 INJECTION, SOLUTION INTRAMUSCULAR; INTRAVENOUS at 10:01

## 2024-01-03 RX ADMIN — PHENYLEPHRINE HYDROCHLORIDE 50 MCG: 10 INJECTION INTRAVENOUS at 01:01

## 2024-01-03 RX ADMIN — PHENYLEPHRINE HYDROCHLORIDE 100 MCG: 10 INJECTION INTRAVENOUS at 12:01

## 2024-01-03 RX ADMIN — HYDROMORPHONE HYDROCHLORIDE 0.4 MG: 2 INJECTION INTRAMUSCULAR; INTRAVENOUS; SUBCUTANEOUS at 03:01

## 2024-01-03 RX ADMIN — SODIUM CHLORIDE, SODIUM GLUCONATE, SODIUM ACETATE, POTASSIUM CHLORIDE AND MAGNESIUM CHLORIDE: 526; 502; 368; 37; 30 INJECTION, SOLUTION INTRAVENOUS at 02:01

## 2024-01-03 RX ADMIN — ROCURONIUM BROMIDE 15 MG: 10 SOLUTION INTRAVENOUS at 01:01

## 2024-01-03 RX ADMIN — MUPIROCIN: 20 OINTMENT TOPICAL at 08:01

## 2024-01-03 RX ADMIN — CARVEDILOL 12.5 MG: 12.5 TABLET, FILM COATED ORAL at 08:01

## 2024-01-03 RX ADMIN — PHENYLEPHRINE HYDROCHLORIDE 15 MCG/MIN: 10 INJECTION INTRAVENOUS at 10:01

## 2024-01-03 RX ADMIN — HEPARIN 500 UNITS: 100 SYRINGE at 09:01

## 2024-01-03 RX ADMIN — PROPOFOL 160 MG: 10 INJECTION, EMULSION INTRAVENOUS at 10:01

## 2024-01-03 RX ADMIN — Medication 2 G: at 10:01

## 2024-01-03 RX ADMIN — CEFAZOLIN SODIUM 2 G: 2 SOLUTION INTRAVENOUS at 10:01

## 2024-01-03 RX ADMIN — ONDANSETRON 4 MG: 2 INJECTION INTRAMUSCULAR; INTRAVENOUS at 10:01

## 2024-01-03 RX ADMIN — GLYCOPYRROLATE 0.2 MG: 0.2 INJECTION INTRAMUSCULAR; INTRAVENOUS at 11:01

## 2024-01-03 RX ADMIN — DEXAMETHASONE SODIUM PHOSPHATE 4 MG: 4 INJECTION, SOLUTION INTRA-ARTICULAR; INTRALESIONAL; INTRAMUSCULAR; INTRAVENOUS; SOFT TISSUE at 10:01

## 2024-01-03 RX ADMIN — HEPARIN SODIUM 7000 UNITS: 1000 INJECTION, SOLUTION INTRAVENOUS; SUBCUTANEOUS at 11:01

## 2024-01-03 RX ADMIN — HYDROMORPHONE HYDROCHLORIDE 0.4 MG: 2 INJECTION INTRAMUSCULAR; INTRAVENOUS; SUBCUTANEOUS at 04:01

## 2024-01-03 RX ADMIN — PROTAMINE SULFATE 10 MG: 10 INJECTION, SOLUTION INTRAVENOUS at 02:01

## 2024-01-03 RX ADMIN — HEPARIN SODIUM 4000 UNITS: 1000 INJECTION, SOLUTION INTRAVENOUS; SUBCUTANEOUS at 01:01

## 2024-01-03 RX ADMIN — HYDROCODONE BITARTRATE AND ACETAMINOPHEN 1 TABLET: 5; 325 TABLET ORAL at 06:01

## 2024-01-03 RX ADMIN — HEPARIN SODIUM 2000 UNITS: 1000 INJECTION, SOLUTION INTRAVENOUS; SUBCUTANEOUS at 12:01

## 2024-01-03 RX ADMIN — ROCURONIUM BROMIDE 10 MG: 10 SOLUTION INTRAVENOUS at 12:01

## 2024-01-03 RX ADMIN — FENTANYL CITRATE 25 MCG: 50 INJECTION, SOLUTION INTRAMUSCULAR; INTRAVENOUS at 02:01

## 2024-01-03 RX ADMIN — PROTAMINE SULFATE 40 MG: 10 INJECTION, SOLUTION INTRAVENOUS at 02:01

## 2024-01-03 RX ADMIN — PROPOFOL 50 MG: 10 INJECTION, EMULSION INTRAVENOUS at 01:01

## 2024-01-03 RX ADMIN — HEPARIN SODIUM 3000 UNITS: 1000 INJECTION, SOLUTION INTRAVENOUS; SUBCUTANEOUS at 01:01

## 2024-01-03 RX ADMIN — SODIUM CHLORIDE, SODIUM GLUCONATE, SODIUM ACETATE, POTASSIUM CHLORIDE AND MAGNESIUM CHLORIDE: 526; 502; 368; 37; 30 INJECTION, SOLUTION INTRAVENOUS at 10:01

## 2024-01-03 NOTE — ANESTHESIA PROCEDURE NOTES
Intubation    Date/Time: 1/3/2024 10:36 AM    Performed by: Kristopher Garcia CRNA  Authorized by: Dayne Enrique MD    Intubation:     Induction:  Intravenous    Intubated:  Postinduction    Mask Ventilation:  Easy with oral airway    Attempts:  1    Attempted By:  CRNA    Method of Intubation:  Direct    Blade:  Mckeon 2    Laryngeal View Grade: Grade IIA - cords partially seen      Difficult Airway Encountered?: No      Complications:  None    Airway Device:  Oral endotracheal tube    Airway Device Size:  7.5    Style/Cuff Inflation:  Cuffed (inflated to minimal occlusive pressure)    Inflation Amount (mL):  6    Tube secured:  21    Secured at:  The lips    Placement Verified By:  Capnometry    Complicating Factors:  None    Findings Post-Intubation:  BS equal bilateral

## 2024-01-03 NOTE — OP NOTE
Surgeon: Odette Nicholas MD    Date: 01/03/2024     Diagnosis: Pre-Op Diagnosis Codes:     * Bilateral carotid artery stenosis [I65.23]     Procedure:  Right carotid endarterectomy with subsequent carotid excision with interposition PTFE graft.    History of Presenting Illness: Mr. Kim is a 63 y.o. year old male who has carotid stenosis and presents for carotid endarterectomy for stroke risk reduction.      Procedure:  The patient was taken to the operating room and placed in a supine position.  General endotracheal anesthesia was initiated antibiotics were given.  The Right neck was prepped and draped in the usual sterile fashion.  Cerebral oximetry was in place.  B-mode ultrasound was utilized to evaluate the carotid system and determine the level of the carotid bifurcation.  Incision was made over the carotid system dissection was performed through the subcutaneous tissues platysma and anterior to the sternocleidomastoid.  The carotid sheath was opened and the carotid system was circumferentially controlled.  We were able to gain control of the common carotid arteries well as the external and internal carotid arteries.  Hypoglossal nerve was kept free from harm.  Dissection was more extensive than planned given the long plaque appreciated with palpation of the carotid system.  There was hard calcific plaque extending a long distance on the internal carotid artery.  More superior dissection was required this did take some time.  Superior thyroidal branch was controlled with a clip and external carotid artery was controlled with a vessel loop.  Heparinization was performed and hypertension was confirmed.  The internal carotid artery was clamped no significant cerebral oximetry decline was noted.  We then clamped the common carotid and external carotid arteries.  Longitudinal arteriotomy was made from the distal common carotid artery through the carotid bulb into the midportion of the internal carotid artery.   Endarterectomy was performed with eversion endarterectomy of the external carotid artery.  Endarterectomy also took a significant amount of time as the heavily calcified plaque was transmural in some areas.  Did not easily dissect out and separate along normal planes.  There were several segments were plaque remained adherent to the vessel adventitia.  Seven 0 Prolene repair was required for adventitial defect near the bifurcation.  Ensured a good distal endpoint.  We then fashioned a bovine pericardial patch and performed bovine patch angioplasty with running 6 0 Prolene suture.  The endarterectomy length and thus the patch length was almost 6 cm.  Prior to completing the angioplasty we did backbleed the internal carotid artery and external carotid artery and then forward bled the common carotid artery. Heparin irrigation was performed to the lumen of the vessel.  We completed the angioplasty and allowed flow through the common carotid and external carotid arteries.  We then allowed antegrade flow into the internal carotid artery.  We ensured there was good hemostasis and performed B-mode ultrasound evaluation of the vessel ensuring good endpoints proximally and distally.  While there was a good endpoint proximally and distally I was unsatisfied with a significant stenosis near the bifurcation that was likely a result of adventitial injuries and lack of healthy adventitia at that level.  Additional heparin was given clamps were placed and arteriotomy was made on the posterior portion of the vessel.  Bovine patch was placed on this location to alleviate the stenosis.  We did backbleed and forward bleed the vessel prior to completing our angioplasty.  Ultrasound was again performed and I was again unhappy with the result in that location.  Decision was made to excise the unhealthy portions of the carotid system.  We did excise the common carotid through a good portion of the internal carotid.  A [place]-Henri graft was  utilized as an interposition.  This was a tapered graft from 4 mm to 7 mm.  We transected the graft within the taper to best match our distal internal carotid artery.  End-to-end anastomosis was made with PTFE graft and our internal carotid artery.  Additional end-to-end anastomosis was made with the common carotid artery with care not to leave redundant amount of graft.  Each anastomosis was made with a running [place]-Henri CV6 suture.  We did make a small graftotomy on the side of the graft and we reimplanted the external carotid artery also with a [place]-Henri CV6 suture.  Prior to completing our common carotid anastomosis and the external carotid artery anastomosis I did forward bleed and backbleed the system.  And also heparinized it.  After completing our anastomosis to the external carotid artery, flow was allowed through the common carotid and external systems and then the internal system.  I was satisfied with the repair at this point.  Cerebral oximetry did well throughout the case as did his hemodynamics.  Protamine was then administered.  We again ensured good hemostasis and the sternocleidomastoid was reapproximated to the adjacent fascia with a 3-0 Vicryl suture.  The platysma was reapproximated with 3-0 Vicryl suture as well. 4-0 Monocryl was used to reapproximate the skin. Dermabond dressing was placed.  This completed our procedure.    He did follow commands well postprocedure.  Had good cranial nerve function and moved all extremities well.      Complications:  none    Findings:  Dense long calcific plaque throughout the distal common carotid and a long segment of the internal carotid artery.  This was transmural in some areas.  Complicated repair that ultimately resulted in excision of the diseased segment of common carotid and internal carotid artery with interposition PTFE graft placement.

## 2024-01-03 NOTE — NURSING
Nurses Note -- 4 Eyes      1/3/2024   5:57 PM      Skin assessed during: Transfer      [x] No Altered Skin Integrity Present    []Prevention Measures Documented      [] Yes- Altered Skin Integrity Present or Discovered   [] LDA Added if Not in Epic (Describe Wound)   [] New Altered Skin Integrity was Present on Admit and Documented in LDA   [] Wound Image Taken    Wound Care Consulted? No    Attending Nurse:  Aimee Umana RN/Staff Member:   lisa

## 2024-01-03 NOTE — ANESTHESIA PREPROCEDURE EVALUATION
01/03/2024  Flip Kim is a 63 y.o., male.  Asymptomatic R ICS  H/o CABG 2019  EKG NSR    \Last EF 55-60%      Pre-op Assessment    I have reviewed the Patient Summary Reports.     I have reviewed the Nursing Notes. I have reviewed the NPO Status.   I have reviewed the Medications.     Review of Systems  Anesthesia Hx:  No problems with previous Anesthesia                Social:  Non-Smoker       Cardiovascular:     Hypertension   CAD                  Coronary Artery Disease:                            Hypertension         Musculoskeletal:  Arthritis        Arthritis          Neurological:      Arthritis                               Physical Exam  General: Well nourished, Cooperative, Alert and Oriented    Airway:  Mallampati: II   Mouth Opening: Normal  TM Distance: Normal  Tongue: Normal  Neck ROM: Normal ROM    Dental:  Intact, Caps / Implants  cap      Anesthesia Plan  Type of Anesthesia, risks & benefits discussed:    Anesthesia Type: Gen ETT  Intra-op Monitoring Plan: Standard ASA Monitors and Art Line  Post Op Pain Control Plan: multimodal analgesia and IV/PO Opioids PRN  Airway Plan: Direct, Post-Induction  Informed Consent: Informed consent signed with the Patient and all parties understand the risks and agree with anesthesia plan.  All questions answered. Patient consented to blood products? Yes  ASA Score: 3  Day of Surgery Review of History & Physical: H&P Update referred to the surgeon/provider.    Ready For Surgery From Anesthesia Perspective.     .

## 2024-01-03 NOTE — TRANSFER OF CARE
"Anesthesia Transfer of Care Note    Patient: Flip Kim    Procedure(s) Performed: Procedure(s) (LRB):  ENDARTERECTOMY-CAROTID (Right)    Patient location: PACU    Anesthesia Type: general    Transport from OR: Transported from OR on room air with adequate spontaneous ventilation    Post pain: adequate analgesia    Post assessment: no apparent anesthetic complications    Post vital signs: stable    Level of consciousness: awake    Nausea/Vomiting: no nausea/vomiting    Complications: none    Transfer of care protocol was followed      Last vitals: Visit Vitals  /85 (BP Location: Right arm)   Pulse 67   Temp 36.7 °C (98.1 °F) (Oral)   Resp 18   Ht 5' 9" (1.753 m)   Wt 77.1 kg (170 lb)   SpO2 97%   BMI 25.10 kg/m²     "

## 2024-01-03 NOTE — INTERVAL H&P NOTE
The patient has been examined and the H&P has been reviewed:    I concur with the findings and no changes have occurred since H&P was written. Good cranial nerve and extremity function.    Surgery risks, benefits and alternative options discussed and understood by patient/family.          There are no hospital problems to display for this patient.

## 2024-01-03 NOTE — ANESTHESIA POSTPROCEDURE EVALUATION
Anesthesia Post Evaluation    Patient: Flip Kim    Procedure(s) Performed: Procedure(s) (LRB):  ENDARTERECTOMY-CAROTID (Right)    Final Anesthesia Type: general      Patient location during evaluation: PACU  Patient participation: Yes- Able to Participate  Level of consciousness: oriented and awake  Post-procedure vital signs: reviewed and stable  Pain management: adequate  Airway patency: patent    PONV status at discharge: No PONV  Anesthetic complications: no      Cardiovascular status: stable and hemodynamically stable  Respiratory status: spontaneous ventilation and unassisted  Hydration status: euvolemic  Follow-up not needed.  Comments: [facility]              Vitals Value Taken Time   /73 01/03/24 1621   Temp 36.6 °C (97.8 °F) 01/03/24 1510   Pulse 99 01/03/24 1623   Resp 12 01/03/24 1623   SpO2 96 % 01/03/24 1623   Vitals shown include unvalidated device data.      No case tracking events are documented in the log.      Pain/Annie Score: Pain Rating Prior to Med Admin: 6 (1/3/2024  4:05 PM)  Annie Score: 9 (1/3/2024  4:05 PM)

## 2024-01-03 NOTE — ANESTHESIA PROCEDURE NOTES
Arterial    Diagnosis: right carotid artery stenosis    Patient location during procedure: holding area  Procedure start time: 1/3/2024 9:15 AM  Timeout: 1/3/2024 9:15 AM  Procedure end time: 1/3/2024 9:20 AM    Staffing  Authorizing Provider: Dayne Enrique MD  Performing Provider: Kristopher Garcia CRNA    Staffing  Performed by: Kristopher Garcia CRNA  Authorized by: Dayne Enrique MD    Arterial  Skin Prep: chlorhexidine gluconate  Local Infiltration: lidocaine  Orientation: right  Location: radial    Catheter Size: 20 G  Catheter placement by Anatomical landmarks. Heme positive aspiration all ports. Insertion Attempts: 1  Assessment  Dressing: secured with tape and tegaderm  Patient: Tolerated well

## 2024-01-04 VITALS
TEMPERATURE: 99 F | WEIGHT: 170 LBS | BODY MASS INDEX: 25.18 KG/M2 | RESPIRATION RATE: 17 BRPM | HEART RATE: 80 BPM | DIASTOLIC BLOOD PRESSURE: 82 MMHG | HEIGHT: 69 IN | OXYGEN SATURATION: 96 % | SYSTOLIC BLOOD PRESSURE: 135 MMHG

## 2024-01-04 PROCEDURE — 25000003 PHARM REV CODE 250: Performed by: SPECIALIST

## 2024-01-04 PROCEDURE — 63600175 PHARM REV CODE 636 W HCPCS: Performed by: SPECIALIST

## 2024-01-04 RX ORDER — KETOROLAC TROMETHAMINE 30 MG/ML
30 INJECTION, SOLUTION INTRAMUSCULAR; INTRAVENOUS ONCE
Status: COMPLETED | OUTPATIENT
Start: 2024-01-04 | End: 2024-01-04

## 2024-01-04 RX ORDER — HYDROCODONE BITARTRATE AND ACETAMINOPHEN 10; 325 MG/1; MG/1
1 TABLET ORAL EVERY 4 HOURS PRN
Qty: 20 TABLET | Refills: 0 | Status: SHIPPED | OUTPATIENT
Start: 2024-01-04 | End: 2024-01-16

## 2024-01-04 RX ADMIN — CEFAZOLIN SODIUM 2 G: 2 SOLUTION INTRAVENOUS at 06:01

## 2024-01-04 RX ADMIN — ASPIRIN 81 MG: 81 TABLET, CHEWABLE ORAL at 06:01

## 2024-01-04 RX ADMIN — HYDROCODONE BITARTRATE AND ACETAMINOPHEN 1 TABLET: 10; 325 TABLET ORAL at 02:01

## 2024-01-04 RX ADMIN — KETOROLAC TROMETHAMINE 30 MG: 30 INJECTION, SOLUTION INTRAMUSCULAR at 08:01

## 2024-01-04 RX ADMIN — HYDROCODONE BITARTRATE AND ACETAMINOPHEN 1 TABLET: 10; 325 TABLET ORAL at 06:01

## 2024-01-04 NOTE — DISCHARGE SUMMARY
[facility] 6th Floor Medical Telemetry  Discharge Note  Short Stay    Procedure(s) (LRB):  ENDARTERECTOMY-CAROTID (Right)      OUTCOME: Patient tolerated treatment/procedure well without complication and is now ready for discharge.    DISPOSITION: Home or Self Care    FINAL DIAGNOSIS:  Bilateral carotid artery stenosis    FOLLOWUP: In clinic    DISCHARGE INSTRUCTIONS:    Discharge Procedure Orders   Diet Adult Regular     No driving until:   Order Comments: For 7 days     No dressing needed   Scheduling Instructions: Ok to shower. Cleans wound area with antibacterial soap and pat dry.  Leave dermabond in place (will fall off when appropriate).     Notify your health care provider if you experience any of the following:  severe uncontrolled pain     Notify your health care provider if you experience any of the following:  severe persistent headache     Notify your health care provider if you experience any of the following:  persistent dizziness, light-headedness, or visual disturbances     Notify your health care provider if you experience any of the following:  increased confusion or weakness     Activity as tolerated        TIME SPENT ON DISCHARGE: 5 minutes

## 2024-01-04 NOTE — PROGRESS NOTES
[facility] - 6th Floor Medical Telemetry  Vascular Surgery  Progress Note    Patient Name: Flip Kim  MRN: [id number]  Admission Date: 1/3/2024  Primary Care Provider: Mook Lerma DO    Subjective:     Interval History: Some neck pain (posterior-medial) - does have history of neck issues;   No extremity issues.  Tolerating liquids - not much solids.     Post-Op Info:  Procedure(s) (LRB):  ENDARTERECTOMY-CAROTID (Right)   1 Day Post-Op      Medications:  Continuous Infusions:  Scheduled Meds:   amLODIPine  10 mg Oral Daily    aspirin  81 mg Oral QAM    atorvastatin  40 mg Oral Daily    busPIRone  15 mg Oral BID    carvediloL  12.5 mg Oral BID    ceFAZolin (ANCEF) IVPB  2 g Intravenous Q8H    ipratropium  2 spray Each Nostril TID    mupirocin   Nasal BID     PRN Meds:heparin, porcine (PF), hydrALAZINE, HYDROcodone-acetaminophen, HYDROcodone-acetaminophen, HYDROmorphone, ondansetron     Objective:     Vital Signs (Most Recent):  Temp: 97.4 °F (36.3 °C) (01/04/24 0245)  Pulse: 83 (01/04/24 0245)  Resp: 17 (01/04/24 0636)  BP: (!) 145/81 (01/04/24 0245)  SpO2: 96 % (01/04/24 0245) Vital Signs (24h Range):  Temp:  [97.3 °F (36.3 °C)-97.8 °F (36.6 °C)] 97.4 °F (36.3 °C)  Pulse:  [] 83  Resp:  [12-20] 17  SpO2:  [93 %-98 %] 96 %  BP: ()/() 145/81         Physical Exam  Alert, comfortable appearing  Right neck incision soft and appropriate  Walking in the room  Good speech  Good cranial nerve function  Good extremity function        Assessment/Plan:     Active Diagnoses:    Diagnosis Date Noted POA    PRINCIPAL PROBLEM:  Bilateral carotid artery stenosis [I65.23] 01/03/2024 Yes      Problems Resolved During this Admission:     POD#1 s/p complicate left carotid endarterectomy with carotid excision with interposition graft placement:  Home today if tolerates diet well      Odette Nicholas MD  Vascular Surgery  [facility] - 6th Floor Medical Telemetry

## 2024-01-05 ENCOUNTER — TELEPHONE (OUTPATIENT)
Dept: VASCULAR SURGERY | Facility: CLINIC | Age: 64
End: 2024-01-05
Payer: COMMERCIAL

## 2024-01-05 LAB — PSYCHE PATHOLOGY RESULT: NORMAL

## 2024-01-06 ENCOUNTER — TELEPHONE (OUTPATIENT)
Dept: VASCULAR SURGERY | Facility: HOSPITAL | Age: 64
End: 2024-01-06
Payer: COMMERCIAL

## 2024-01-06 ENCOUNTER — HOSPITAL ENCOUNTER (EMERGENCY)
Facility: HOSPITAL | Age: 64
Discharge: HOME | End: 2024-01-06
Attending: STUDENT IN AN ORGANIZED HEALTH CARE EDUCATION/TRAINING PROGRAM
Payer: COMMERCIAL

## 2024-01-06 VITALS
BODY MASS INDEX: 25.1 KG/M2 | WEIGHT: 170 LBS | RESPIRATION RATE: 18 BRPM | SYSTOLIC BLOOD PRESSURE: 123 MMHG | TEMPERATURE: 98 F | DIASTOLIC BLOOD PRESSURE: 65 MMHG | HEART RATE: 68 BPM | OXYGEN SATURATION: 100 %

## 2024-01-06 DIAGNOSIS — G89.18 POST-OP PAIN: ICD-10-CM

## 2024-01-06 DIAGNOSIS — R60.9 SWELLING: ICD-10-CM

## 2024-01-06 DIAGNOSIS — R22.1 NECK SWELLING: Primary | ICD-10-CM

## 2024-01-06 DIAGNOSIS — M54.2 NECK PAIN: ICD-10-CM

## 2024-01-06 LAB
ALBUMIN SERPL-MCNC: 3.8 G/DL (ref 3.4–4.8)
ALBUMIN/GLOB SERPL: 1.4 RATIO (ref 1.1–2)
ALP SERPL-CCNC: 69 UNIT/L (ref 40–150)
ALT SERPL-CCNC: 37 UNIT/L (ref 0–55)
APTT PPP: 25.2 SECONDS (ref 23.2–33.7)
AST SERPL-CCNC: 44 UNIT/L (ref 5–34)
BASOPHILS # BLD AUTO: 0.05 X10(3)/MCL
BASOPHILS NFR BLD AUTO: 0.7 %
BILIRUB SERPL-MCNC: 0.4 MG/DL
BUN SERPL-MCNC: 15.3 MG/DL (ref 8.4–25.7)
CALCIUM SERPL-MCNC: 9.1 MG/DL (ref 8.8–10)
CHLORIDE SERPL-SCNC: 101 MMOL/L (ref 98–107)
CO2 SERPL-SCNC: 29 MMOL/L (ref 23–31)
CREAT SERPL-MCNC: 0.95 MG/DL (ref 0.73–1.18)
EOSINOPHIL # BLD AUTO: 0.55 X10(3)/MCL (ref 0–0.9)
EOSINOPHIL NFR BLD AUTO: 7.6 %
ERYTHROCYTE [DISTWIDTH] IN BLOOD BY AUTOMATED COUNT: 12.6 % (ref 11.5–17)
GFR SERPLBLD CREATININE-BSD FMLA CKD-EPI: >60 MLS/MIN/1.73/M2
GLOBULIN SER-MCNC: 2.7 GM/DL (ref 2.4–3.5)
GLUCOSE SERPL-MCNC: 111 MG/DL (ref 82–115)
HCT VFR BLD AUTO: 39.8 % (ref 42–52)
HGB BLD-MCNC: 13.6 G/DL (ref 14–18)
IMM GRANULOCYTES # BLD AUTO: 0.02 X10(3)/MCL (ref 0–0.04)
IMM GRANULOCYTES NFR BLD AUTO: 0.3 %
INR PPP: 0.9
LYMPHOCYTES # BLD AUTO: 2.16 X10(3)/MCL (ref 0.6–4.6)
LYMPHOCYTES NFR BLD AUTO: 29.8 %
MCH RBC QN AUTO: 32.3 PG (ref 27–31)
MCHC RBC AUTO-ENTMCNC: 34.2 G/DL (ref 33–36)
MCV RBC AUTO: 94.5 FL (ref 80–94)
MONOCYTES # BLD AUTO: 0.85 X10(3)/MCL (ref 0.1–1.3)
MONOCYTES NFR BLD AUTO: 11.7 %
NEUTROPHILS # BLD AUTO: 3.63 X10(3)/MCL (ref 2.1–9.2)
NEUTROPHILS NFR BLD AUTO: 49.9 %
NRBC BLD AUTO-RTO: 0 %
PLATELET # BLD AUTO: 217 X10(3)/MCL (ref 130–400)
PMV BLD AUTO: 9.3 FL (ref 7.4–10.4)
POTASSIUM SERPL-SCNC: 4.1 MMOL/L (ref 3.5–5.1)
PROT SERPL-MCNC: 6.5 GM/DL (ref 5.8–7.6)
PROTHROMBIN TIME: 12.4 SECONDS (ref 12.5–14.5)
RBC # BLD AUTO: 4.21 X10(6)/MCL (ref 4.7–6.1)
SODIUM SERPL-SCNC: 139 MMOL/L (ref 136–145)
WBC # SPEC AUTO: 7.26 X10(3)/MCL (ref 4.5–11.5)

## 2024-01-06 PROCEDURE — 85610 PROTHROMBIN TIME: CPT | Performed by: STUDENT IN AN ORGANIZED HEALTH CARE EDUCATION/TRAINING PROGRAM

## 2024-01-06 PROCEDURE — 80053 COMPREHEN METABOLIC PANEL: CPT | Performed by: STUDENT IN AN ORGANIZED HEALTH CARE EDUCATION/TRAINING PROGRAM

## 2024-01-06 PROCEDURE — 85730 THROMBOPLASTIN TIME PARTIAL: CPT | Performed by: STUDENT IN AN ORGANIZED HEALTH CARE EDUCATION/TRAINING PROGRAM

## 2024-01-06 PROCEDURE — 25500020 PHARM REV CODE 255: Performed by: STUDENT IN AN ORGANIZED HEALTH CARE EDUCATION/TRAINING PROGRAM

## 2024-01-06 PROCEDURE — 99285 EMERGENCY DEPT VISIT HI MDM: CPT | Mod: 25

## 2024-01-06 PROCEDURE — 85025 COMPLETE CBC W/AUTO DIFF WBC: CPT | Performed by: STUDENT IN AN ORGANIZED HEALTH CARE EDUCATION/TRAINING PROGRAM

## 2024-01-06 RX ADMIN — IOPAMIDOL 100 ML: 755 INJECTION, SOLUTION INTRAVENOUS at 06:01

## 2024-01-06 NOTE — TELEPHONE ENCOUNTER
Mr. Kim called with multiple complaints this afternoon.  He apparently had chest pain which prompted an ER visit on Thursday to [facility].  After waiting an extended time in the emergency department he did ultimately leave as his symptoms had improved.  He has persisted with a right-sided headache and also mentions some right neck swelling.  Chest pain remains resolved.   CEA this past Wednesday was complicated with prolonged carotid clamping given need for initial endarterectomy, revision, and ultimate carotid excision with PTFE interposition graft placement.   He reports a systolic blood pressure in the 120s.  He also denies history of headaches like this before.  No neuro deficits. Swallowing appropriately.      Given the complicated nature of his surgery in his prolonged operative time, I would like him to come in for evaluation.  We will have him come to the ER for evaluation and get a CTA of the neck and head.  We will have a low threshold for overnight observation.  I did contact the emergency department physician, Dr. Overton, to give him a heads up on the patient's arrival.

## 2024-01-06 NOTE — FIRST PROVIDER EVALUATION
Medical screening examination initiated.  I have conducted a focused provider triage encounter, findings are as follows:    Brief history of present illness:  Patient states that he had a right carotid endarterectomy on 01/03/24. States that he has had right sided neck swelling and was instructed to report to the ED.     There were no vitals filed for this visit.    Pertinent physical exam:  Awake, alert, ambulatory      Brief workup plan:  Imaging, Labs    Preliminary workup initiated; this workup will be continued and followed by the physician or advanced practice provider that is assigned to the patient when roomed.

## 2024-01-07 NOTE — ED PROVIDER NOTES
Encounter Date: 1/6/2024    SCRIBE #1 NOTE: IDeb, am scribing for, and in the presence of,  Brock Overton MD. I have scribed the following portions of the note - Other sections scribed: HPI, ROS, PE.       History     Chief Complaint   Patient presents with    Neck Swelling     Patient complaints of right neck swelling worsening since after surgery on Wednesday, had right sided carotid endarterectomy. Denies any sob.     63 year old male with a hx of HLD, HTN, CAD, and bilateral carotid artery stenosis s/p right carotid endarterectomy and graft placement 3 days ago by Dr. Kessler presents to the ED for right sided neck swelling that began after his operation and has been worsening. The patient denies fever or shortness of breath.     The history is provided by the patient. No  was used.     Review of patient's allergies indicates:   Allergen Reactions    Hydrochlorothiazide      Other Reaction(s): Unknown    Metoprolol Other (See Comments)     Past Medical History:   Diagnosis Date    Anxiety     Bilateral carotid artery stenosis     Collar bone fracture     Coronary artery disease     s/p single bypass - 2019    DDD (degenerative disc disease), cervical     Digestive disorder     acid reflux    History of heart attack 2019    HLD (hyperlipidemia)     HTN (hypertension)      Past Surgical History:   Procedure Laterality Date    ANTERIOR CRUCIATE LIGAMENT REPAIR Right 1990    CARDIAC CATHETERIZATION N/A 11/05/2019    Procedure: LEFT HEART CATH; Surgeon: Saritha Santos MD; Location: [facility]; Service: Cardiovascular; Laterality: N/A;    CAROTID ENDARTERECTOMY Right 1/3/2024    Procedure: ENDARTERECTOMY-CAROTID;  Surgeon: Odette Nicholas MD;  Location: [facility];  Service: Peripheral Vascular;  Laterality: Right;  RIGHT    COLONOSCOPY      CORONARY ARTERY BYPASS GRAFT  11/09/2019    Robotically assisted off-pump CABG x1- Dr. VADIM Dumont    KNEE SURGERY Right      SHOULDER ARTHROSCOPY Right     rotator cuff repair    THORACOSCOPY      UPPER GASTROINTESTINAL ENDOSCOPY       No family history on file.  Social History     Tobacco Use    Smoking status: Never    Smokeless tobacco: Never   Substance Use Topics    Alcohol use: Never    Drug use: Never     Review of Systems   Constitutional:  Negative for fever.   HENT:  Negative for sore throat.    Eyes:  Negative for visual disturbance.   Respiratory:  Negative for shortness of breath.    Cardiovascular:  Negative for chest pain.   Gastrointestinal:  Negative for abdominal pain.   Genitourinary:  Negative for dysuria.   Musculoskeletal:  Negative for joint swelling.        Right sided neck swelling    Skin:  Negative for rash.   Neurological:  Negative for weakness.   Psychiatric/Behavioral:  Negative for confusion.    All other systems reviewed and are negative.      Physical Exam     Initial Vitals [01/06/24 1655]   BP Pulse Resp Temp SpO2   133/76 73 18 97.8 °F (36.6 °C) 95 %      MAP       --         Physical Exam    Nursing note and vitals reviewed.  Constitutional: He appears well-developed and well-nourished. He is not diaphoretic. No distress.   HENT:   Head: Normocephalic and atraumatic.   Eyes: Conjunctivae and EOM are normal. Pupils are equal, round, and reactive to light.   Neck: No tracheal deviation present. No JVD present.   Right sided neck swelling over surgical incision; incision is clean, dry, and intact    Normal range of motion.  Cardiovascular:  Normal rate, regular rhythm, normal heart sounds and intact distal pulses.           No murmur heard.  Pulmonary/Chest: Breath sounds normal. No stridor. No respiratory distress. He has no wheezes. He has no rales.   Abdominal: Abdomen is soft. He exhibits no distension. There is no abdominal tenderness.   Musculoskeletal:         General: No tenderness or edema. Normal range of motion.      Cervical back: Normal range of motion.     Neurological: He is alert and  oriented to person, place, and time. No cranial nerve deficit.   Skin: Skin is warm and dry. Capillary refill takes less than 2 seconds. No rash noted. No erythema.   Psychiatric: He has a normal mood and affect.         ED Course   Procedures  Labs Reviewed   COMPREHENSIVE METABOLIC PANEL - Abnormal; Notable for the following components:       Result Value    Aspartate Aminotransferase 44 (*)     All other components within normal limits   PROTIME-INR - Abnormal; Notable for the following components:    PT 12.4 (*)     All other components within normal limits   CBC WITH DIFFERENTIAL - Abnormal; Notable for the following components:    RBC 4.21 (*)     Hgb 13.6 (*)     Hct 39.8 (*)     MCV 94.5 (*)     MCH 32.3 (*)     All other components within normal limits   APTT - Normal   CBC W/ AUTO DIFFERENTIAL    Narrative:     The following orders were created for panel order CBC auto differential.  Procedure                               Abnormality         Status                     ---------                               -----------         ------                     CBC with Differential[[id number]]       Abnormal            Final result                 Please view results for these tests on the individual orders.          Imaging Results              CTA Head and Neck (xpd) (Edited Result - FINAL)  Result time 01/07/24 08:13:30      Addendum (preliminary) 1 of 1 by Everardo Fxo MD (01/07/24 08:13:30)      Right neck muscular and extra muscular soft tissue inflammations, small amount of fluid and air locules likely are postsurgical though possibility of associated infection cannot be excluded by imaging alone.  Some of these findings track inferiorly in between the common carotid artery and the internal jugular vein.  Again there is no organized collection to indicate an abscess.  Please correlate clinically.  There is also compressive effect and narrowing of the proximal right internal jugular vein though flow is  present throughout.    Findings were discussed with Dr. Yousif January 7, 2024 at 08:12 hours.      Electronically signed by: Everardo Fox  Date:    01/07/2024  Time:    08:13                 Final result by Everardo Fox MD (01/06/24 19:01:37)                   Impression:      1.  Unremarkable carotid graft.  Right neck sternocleidomastoid intramuscular and extra muscular soft tissue inflammation and air locules likely are postsurgical.  No organized collection identified to suggest hematoma or an infected collection.    2.  Hemodynamically significant stenosis proximal left internal carotid artery.    3.  Marked stenosis distal right vertebral artery.      Electronically signed by: Everardo Fox  Date:    01/06/2024  Time:    19:01               Narrative:    EXAMINATION:  CTA HEAD AND NECK (XPD)    CLINICAL HISTORY:  Recent CTA graft non swelling and pain and pain to the right side    TECHNIQUE:  Brain and neck imaging was performed from skull base to vertex prior to intravenous contrast. CT Angiogram of head and neck was subsequently performed following intravenous contrast administration.  Coronal and sagittal MPR reconstructions were performed in addition to coronal and sagittal MIP reconstructions.    Dose length product was 1708 mGycm. Automated exposure control was utilized to minimize radiation dose.    COMPARISON:  CT brain without contrast January 6, 2024.    FINDINGS:  Carotid arteries are assessed in accordance with the NASCET criteria.    Visualized portion of the thoracic aorta is remarkable for mild calcified plaques without aneurysmal dilatation or dissection.  There are mild calcified plaques which involve the brachiocephalic trunk and the subclavian arteries without hemodynamically significant stenosis.  There is unremarkable contrast flow and contour of the right common carotid artery with the exception of minimal calcified plaque on image 562 and image 549 series 2.  There is unremarkable  flow within right internal carotid artery throughout without evidence of spasm, dissection or narrowing.  No evidence of contrast extravasation.  There are right neck sternocleidomastoid musculature and extra muscular soft tissue inflammations and air locules which are favored to be postsurgical.  No localized collection identified to suggest a hematoma or infected collection.    There is no significant stenosis noted in left common carotid artery.  There are calcified plaques which involve the proximal portion of the left internal carotid artery which protrude into the lumen resulting in greater than 80% stenosis like on image 496 series 18.  Calcified plaques involve bilateral cavernous and supraclinoid portion of the internal carotid artery resulting in mild stenosis.  There is unremarkable flow within the anterior cerebral arteries, middle cerebral arteries and the major branches.    There is flow throughout bilateral vertebral arteries.  Calcified plaques also involve the distal right vertebral artery resulting in over 60% stenosis.  There are also mild calcified plaques involve the distal left vertebral artery.  Basilar artery is patent.  Flow is seen bilaterally within the posterior cerebral arteries.    Visualized portion of the lungs are clear unremarkable contrast opacification of the thyroid gland                                       CT Head Without Contrast (Final result)  Result time 01/06/24 18:32:20      Final result by Roberto Schneider MD (01/06/24 18:32:20)                   Narrative:    EXAMINATION  CT HEAD WITHOUT CONTRAST    CLINICAL HISTORY  weakness;    TECHNIQUE  Axial non-contrast CT images of the head were acquired and multiplanar reconstructions accomplished by a CT technologist at a separate workstation, pushed to PACS for physician review.    COMPARISON  None available at the time of the initial interpretation.    FINDINGS  Images were reviewed in subdural, brain, soft tissue, and bone  windows.    Exam quality: Motion/streak artifact limits assessment of the posterior fossa.    Hemorrhage: No evidence of acute hyperattenuating blood products.    Parenchyma: No discrete mass, localized mass-effect, or CT evidence of an acute territorial cortical-based ischemic insult. Gray-white differentiation is preserved.    Midline shift: None.    CSF spaces: Normal ventricle size and configuration. No extra-axial masses or expansile fluid collections.    Vasculature: Scattered bilateral intracranial ICA calcification incidentally noted.  No hyperdense artery identified. No abnormal densities within the dural sinuses.    Other findings: No abnormalities of the scalp or subjacent osseous structures. Mastoids are well aerated. No focal abnormality of the sella. The included facial structures are unremarkable.    IMPRESSION  1. No convincing acute intracranial abnormality.  2. Nonacute secondary details discussed above.    RADIATION DOSE  Automated tube current modulation, weight-based exposure dosing, and/or iterative reconstruction technique utilized to reach lowest reasonably achievable exposure rate.    DLP: 1033 mGy*cm      Electronically signed by: Roberto Schneider  Date:    01/06/2024  Time:    18:32                                     Medications   iopamidoL (ISOVUE-370) injection 100 mL (100 mLs Intravenous Given 1/6/24 1840)     Medical Decision Making  Problems Addressed:  Neck pain: acute illness or injury that poses a threat to life or bodily functions  Neck swelling: acute illness or injury that poses a threat to life or bodily functions  Post-op pain: acute illness or injury that poses a threat to life or bodily functions  Swelling: acute illness or injury that poses a threat to life or bodily functions    Amount and/or Complexity of Data Reviewed  Labs: ordered.  Radiology: ordered.    Risk  Prescription drug management.  Parenteral controlled substances.  Decision regarding hospitalization.             Scribe Attestation:   Scribe #1: I performed the above scribed service and the documentation accurately describes the services I performed. I attest to the accuracy of the note.    Attending Attestation:           Physician Attestation for Scribe:  Physician Attestation Statement for Scribe #1: I, Brock Overton MD, reviewed documentation, as scribed by Deb Lehman in my presence, and it is both accurate and complete.             ED Course as of 01/22/24 1110   Sat Jan 06, 2024   1922 Paged Dr. Kessler  [MM]   1956 Dr. Kessler will come see pt  [MM]      ED Course User Index  [MM] Deb Lehman               Medical Decision Making:   History:   I obtained history from: someone other than patient and primary care / consultant.       <> Summary of History: Collateral history obtained from the patient's wife.  Patient had surgery for carotid endarterectomy few days ago.      Old Medical Records: I decided to obtain old medical records.  Old Records Summarized: records from clinic visits, records from another hospital and records from previous admission(s).       <> Summary of Records: Reviewed old records, reviewed recent surgical procedure  Initial Assessment:   Neck swelling.  Differential Diagnosis:   Judging by the patient's chief complaint and pertinent history, the patient has the following possible differential diagnoses, including but not limited to the following.  Some of these are deemed to be lower likelihood and some more likely based on my physical exam and history combined with possible lab work and/or imaging studies.   Please see the pertinent studies, and refer to the HPI.  Some of these diagnoses will take further evaluation to fully rule out, perhaps as an outpatient and the patient was encouraged to follow up when discharged for more comprehensive evaluation.      Normal Postop swelling, contusion, extravasation, infectious process, vascular injury  Clinical Tests:   Lab Tests: Ordered and  Reviewed  Radiological Study: Ordered and Reviewed  ED Management:  Patient is a 63-year-old male with past medical history of carotid endarterectomy presents to the emergency department for right-sided neck swelling and pain.  On examination surgical incision site is clean dry and intact.  There is some right-sided edema.  Patient afebrile, vital signs stable.  No acute neuro deficits appreciated.  There is no airway compromise.  No stridor.  No dysphagia.  Discussed with vascular surgery.  CTA obtained is as noted.  Some expected postop edema is present.  Low suspicion for any infectious process given that the patient is afebrile, vital signs otherwise stable.  No leukocytosis on lab work.  All results discussed with the patient.  Discussed need for follow-up with vascular surgery.  Discussed return precautions.  Answered all questions at this time.  Hemodynamically stable for continued outpatient management strict return precautions.  Patient and family verbalized understanding and agreed to plan.  Other:   I have discussed this case with another health care provider.       <> Summary of the Discussion: Discussed case with vascular surgery, spoke with Dr. Nicholas             Clinical Impression:  Final diagnoses:  [R22.1] Neck swelling (Primary)  [M54.2] Neck pain  [R60.9] Swelling  [G89.18] Post-op pain          ED Disposition Condition    Discharge Stable          ED Prescriptions    None       Follow-up Information       Follow up With Specialties Details Why Contact Info    Mook Lerma DO Family Medicine   [address]  [address]  [facility]  [place] [address]  [phone]      Primary Care  Call in 1 day  Please call [phone] for a primary care provider.    Odette Nicholas MD Vascular Surgery   [facility]  [address]  [facility] [id number] [phone]               Brock Overton MD  01/22/24 7101

## 2024-01-07 NOTE — CONSULTS
[facility] - Emergency Dept  Vascular Surgery  Consult Note    Consults  Subjective:     Chief Complaint/Reason for Admission: neck swelling    History of Present Illness: Mr. Kim is a 64 y/o man who is POD#3 after right carotid reconstruction after complicated endarterectomy.  He complains of right neck swelling and right sided headaches.   He does state that he feel better than this morning.  No neuro deficits;    (Not in a hospital admission)      Review of patient's allergies indicates:   Allergen Reactions    Hydrochlorothiazide      Other Reaction(s): Unknown    Metoprolol Other (See Comments)       Past Medical History:   Diagnosis Date    Anxiety     Bilateral carotid artery stenosis     Collar bone fracture     Coronary artery disease     s/p single bypass - 2019    DDD (degenerative disc disease), cervical     Digestive disorder     acid reflux    History of heart attack 2019    HLD (hyperlipidemia)     HTN (hypertension)      Past Surgical History:   Procedure Laterality Date    ANTERIOR CRUCIATE LIGAMENT REPAIR Right 1990    CARDIAC CATHETERIZATION N/A 11/05/2019    Procedure: LEFT HEART CATH; Surgeon: Saritha Santos MD; Location: [facility]; Service: Cardiovascular; Laterality: N/A;    CAROTID ENDARTERECTOMY Right 1/3/2024    Procedure: ENDARTERECTOMY-CAROTID;  Surgeon: Odette Nicholas MD;  Location: [facility];  Service: Peripheral Vascular;  Laterality: Right;  RIGHT    COLONOSCOPY      CORONARY ARTERY BYPASS GRAFT  11/09/2019    Robotically assisted off-pump CABG x1- Dr. VADIM Dumont    KNEE SURGERY Right     SHOULDER ARTHROSCOPY Right     rotator cuff repair    THORACOSCOPY      UPPER GASTROINTESTINAL ENDOSCOPY       Family History    None       Tobacco Use    Smoking status: Never    Smokeless tobacco: Never   Substance and Sexual Activity    Alcohol use: Never    Drug use: Never    Sexual activity: Not on file     Review of Systems  Objective:     Vital Signs  (Most Recent):  Temp: 97.8 °F (36.6 °C) (01/06/24 1655)  Pulse: 68 (01/06/24 1957)  Resp: 18 (01/06/24 1655)  BP: 123/65 (01/06/24 1901)  SpO2: 100 % (01/06/24 1957) Vital Signs (24h Range):  Temp:  [97.8 °F (36.6 °C)] 97.8 °F (36.6 °C)  Pulse:  [67-79] 68  Resp:  [18] 18  SpO2:  [95 %-100 %] 100 %  BP: (123-134)/(65-77) 123/65     Weight: 77.1 kg (170 lb)  Body mass index is 25.1 kg/m².        Physical Exam  Alert, pleasant and conversant  No cranial nerve deficits  Breathing easily  Right neck with some generalized swelling but no focal mass  Good spontaneous extremity function        Significant Labs:  BMP:   Recent Labs   Lab 01/06/24  1700      K 4.1   CO2 29   BUN 15.3   CREATININE 0.95   CALCIUM 9.1       Significant Diagnostics:  CTA of the head and neck demonstrated a patent right carotid system without defects.  Additionally there was no evidence of hemorrhage or significant hematoma.    Assessment/Plan:     There are no hospital problems to display for this patient.    Mr. Kim is a 63-year-old man who is postop day 3 status post right carotid artery reconstruction after right carotid endarterectomy.  He has some generalized right neck swelling but it appears appropriate for our postsurgical state.  There was no evidence of any active hemorrhage, pseudoaneurysm or significant hematoma.  He has no signs of any cranial nerve deficits.  His headache appears stable in his not been getting worse.  CT of the head does not demonstrate significant edema and he has had no hypertension.  I discussed signs to look for:  Should there be any increasing right neck swelling, worsening to right neck pain or worsening to his headaches, I would like them to contact me.      Thank you for your consult. I will sign off. Please contact us if you have any additional questions.    Odette Nicholas MD  Vascular Surgery  [facility] - Emergency Dept

## 2024-01-07 NOTE — DISCHARGE INSTRUCTIONS
Follow-up with the primary care physician.      Follow-up with Dr. Nicholas.      Return to the emergency department for any new or worsening symptoms, difficulty breathing, shortness of breath, chest pain, nausea, vomiting, or any other concerns.

## 2024-01-07 NOTE — ED NOTES
Patient resting on stretcher w/ spouse at side. Noted moderate swelling to RT side of neck. Well approximated incision noted w/o drainage. Patient states had an endarterectomy on Wednesday et noticed swelling this AM. Denies breathing difficulty. States he does feel a lump when he tries to swallow. RR nonlabored. Patient smiling, no noted resp distress.

## 2024-01-08 ENCOUNTER — TELEPHONE (OUTPATIENT)
Dept: VASCULAR SURGERY | Facility: CLINIC | Age: 64
End: 2024-01-08
Payer: COMMERCIAL

## 2024-01-08 NOTE — TELEPHONE ENCOUNTER
Approved clindamycin regimen and already sent to [facility] this time. Pt to schedule f/u visit if not already in place.   Follow up phone call:  Patient was evaluated in the hospital on 1/6/24. Patient stated 1/8/24 that his swelling to his neck was slightly better. He reports that he feels better today and his symptoms are not worse. I advised him to call the office if his symptoms worsen. Provided him with his follow up appointment.

## 2024-01-09 RX ORDER — HYDROCODONE BITARTRATE AND ACETAMINOPHEN 10; 325 MG/1; MG/1
1 TABLET ORAL EVERY 6 HOURS PRN
Qty: 28 TABLET | Refills: 0 | Status: SHIPPED | OUTPATIENT
Start: 2024-01-09 | End: 2024-01-16

## 2024-01-09 NOTE — PROGRESS NOTES
Called for pain script refill.   I told them I could send in more if needed when I saw them this weekend.  RN to call and notify them - will also check on condition at that time.  Will have him follow up at 3 weeks postop (sooner than normal).   Glad to see him sooner if any new concerns.

## 2024-01-09 NOTE — PROGRESS NOTES
"    [facility] Vascular - Clinic Note  Odette Nicholas MD      Patient Name: Flip Kim                   : 1960      MRN: [id number]   Visit Date: 2024       History Present Illness     Reason for Visit: Carotid Artery Disease    Mr. Kim presents to the clinic for evaluation of his neck swelling s/p right carotid endarterectomy with graft placement 1/3/24. He reports his swelling is improving. Denies fevers or worsening pain. He denies signs or symptoms of stroke or TIA. He reports some generalized fatigue but no specific loss of function.  Overall he is feeling much better when compared to his visit to the ED.      REVIEW OF SYSTEMS:  12 point review of systems conducted, negative except as stated in the history of present illness. See HPI for details.        Physical Exam      Vitals:    24 1259 24 1301   BP: 126/79 120/79   BP Location: Right arm Left arm   Pulse: 60 61   Weight:  77.1 kg (170 lb)   Height:  5' 9" (1.753 m)          General: well-nourished, no acute distress, and healthy appearing, ambulating normally, alert, pleasant, conversant, and oriented  Neurologic: cranial nerves are grossly intact marginal mandibular of the facial nerve and hypoglossal nerve demonstrates appropriate function, no neurologic deficits, no motor deficits, and no sensory deficits  HEENT: grossly normal and no scleral icterus  Neck/Chest: normal  and soft without lymphadenopathy  Respiratory: breathing easily and without respiratory distress  Cardiology: regular rate and rhythm        Musculoskeletal:   Upper Extremity: normal bilateral hand function    Post Operative Incision:   Location: right neck  clean, dry, no drainage, and healing appropriately   Generalized swelling is improved             Assessment and Plan     Mr. Kim is a 63 y.o. man with carotid artery disease who is s/p right carotid endarterectomy with subsequent carotid excision with interposition PTFE graft on 1/3/24. " His right neck incision is healing appropriately and he appears to be doing well post-operatively. Advised continued low-dose aspirin.  Will have him maintain his 6 week follow up with carotid ultrasound.    OK to return to work on 1/29/24.      1. Bilateral carotid artery stenosis          Imaging Obtained/Reviewed   Study: none  Date:           Medical History     Past Medical History:   Diagnosis Date    Anxiety     Bilateral carotid artery stenosis     Collar bone fracture     Coronary artery disease     s/p single bypass - 2019    DDD (degenerative disc disease), cervical     Digestive disorder     acid reflux    History of heart attack 2019    HLD (hyperlipidemia)     HTN (hypertension)      Past Surgical History:   Procedure Laterality Date    ANTERIOR CRUCIATE LIGAMENT REPAIR Right 1990    CARDIAC CATHETERIZATION N/A 11/05/2019    Procedure: LEFT HEART CATH; Surgeon: Saritha Santos MD; Location: [facility] Cardiac Cath [facility]; Service: Cardiovascular; Laterality: N/A;    CAROTID ENDARTERECTOMY Right 1/3/2024    Procedure: ENDARTERECTOMY-CAROTID;  Surgeon: Odette Nicholas MD;  [facility];  Service: Peripheral Vascular;  Laterality: Right;  RIGHT    COLONOSCOPY      CORONARY ARTERY BYPASS GRAFT  11/09/2019    Robotically assisted off-pump CABG x1- Dr. VADIM Dumont    KNEE SURGERY Right     SHOULDER ARTHROSCOPY Right     rotator cuff repair    THORACOSCOPY      UPPER GASTROINTESTINAL ENDOSCOPY       History reviewed. No pertinent family history.  Social History     Socioeconomic History    Marital status:    Tobacco Use    Smoking status: Never    Smokeless tobacco: Never   Substance and Sexual Activity    Alcohol use: Never    Drug use: Never     Current Outpatient Medications   Medication Instructions    amlodipine-benazepril 10-20mg (LOTREL) 10-20 mg per capsule 1 capsule, Oral, Every morning    aspirin 81 MG Chew 1 tablet, Oral, Every morning    atorvastatin (LIPITOR) 40 mg, Oral, Daily     busPIRone (BUSPAR) 15 MG tablet Take 1 Table by mouth in the morning, 1 Tablet at noon and one tablet before bedtime    carvediloL (COREG) 12.5 mg, Oral, 2 times daily    clopidogreL (PLAVIX) 75 mg, Oral    esomeprazole (NEXIUM) 20 mg, Oral, Before breakfast    ipratropium (ATROVENT) 42 mcg (0.06 %) nasal spray USE 2 SPRAYS IN EACH NOSTRIL IN THE MORNING, 2 SPRAYS AT NOON, 2 SPRAYS IN THE EVENING, AND 2 SPRAYS BEFORE BEDTIME FOR 4 DAYS     Review of patient's allergies indicates:   Allergen Reactions    Hydrochlorothiazide      Other Reaction(s): Unknown    Metoprolol Other (See Comments)       Patient Care Team:  Mook Lerma DO as PCP - General (Family Medicine)  Saritha Santos MD as Referring Physician (Cardiology)  Ace Spicer MD as Referring Physician (Gastroenterology)        No follow-ups on file. In addition to their scheduled follow up, the patient has also been instructed to follow up on as needed basis.     Future Appointments   Date Time Provider Department Center   2/20/2024  8:15 AM CV [facility] VASCULAR SURGERY  01 [facility] CULLEN [place]   2/20/2024  9:00 AM [facility]

## 2024-01-10 ENCOUNTER — PATIENT OUTREACH (OUTPATIENT)
Dept: ADMINISTRATIVE | Facility: CLINIC | Age: 64
End: 2024-01-10
Payer: COMMERCIAL

## 2024-01-10 NOTE — PROGRESS NOTES
C3 nurse spoke with Flip Kim  for a TCC post hospital discharge follow up call. The patient has a scheduled HOSFU appointment with Dr. Nicholas on 01/16/2024 @ 8 am.

## 2024-01-16 ENCOUNTER — OFFICE VISIT (OUTPATIENT)
Dept: VASCULAR SURGERY | Facility: CLINIC | Age: 64
End: 2024-01-16
Payer: COMMERCIAL

## 2024-01-16 VITALS
HEART RATE: 61 BPM | HEIGHT: 69 IN | SYSTOLIC BLOOD PRESSURE: 120 MMHG | DIASTOLIC BLOOD PRESSURE: 79 MMHG | BODY MASS INDEX: 25.18 KG/M2 | WEIGHT: 170 LBS

## 2024-01-16 DIAGNOSIS — I65.23 BILATERAL CAROTID ARTERY STENOSIS: Primary | ICD-10-CM

## 2024-01-16 PROCEDURE — 1160F RVW MEDS BY RX/DR IN RCRD: CPT | Mod: CPTII,,, | Performed by: SPECIALIST

## 2024-01-16 PROCEDURE — 1159F MED LIST DOCD IN RCRD: CPT | Mod: CPTII,,, | Performed by: SPECIALIST

## 2024-01-16 PROCEDURE — 99024 POSTOP FOLLOW-UP VISIT: CPT | Mod: ,,, | Performed by: SPECIALIST

## 2024-01-16 PROCEDURE — 3074F SYST BP LT 130 MM HG: CPT | Mod: CPTII,,, | Performed by: SPECIALIST

## 2024-01-16 PROCEDURE — 3078F DIAST BP <80 MM HG: CPT | Mod: CPTII,,, | Performed by: SPECIALIST

## 2024-02-15 NOTE — PROGRESS NOTES
"    [facility] Vascular - Clinic Note  Odette Nicholas MD      Patient Name: Flip Kim                   : 1960      MRN: [id number]   Visit Date: 2024       History Present Illness     Reason for Visit: Carotid Artery Disease    Mr. Kim presents to the clinic for 6 week follow up s/p right carotid endarterectomy 1/3/24. He denies fevers, drainage from the incision, worsening pain or swelling to his neck. No complications with incision healing. He reports improvement in his level of fatigue since surgery. Denies signs or symptoms of stroke or TIA. Denies difficulty swallowing or jaw use.       REVIEW OF SYSTEMS:  ROS  12 point review of systems conducted, negative except as stated in the history of present illness. See HPI for details.        Physical Exam      Vitals:    24 0845 24 0846   BP: 115/75 117/77   BP Location: Left arm Right arm   Pulse: 62 60   Weight: 77.1 kg (170 lb)    Height: 5' 9" (1.753 m)           General: well-nourished, no acute distress, and healthy appearing, ambulating normally, alert, pleasant, conversant, and oriented  Neurologic: cranial nerves are grossly intact, no neurologic deficits, no motor deficits, and no sensory deficits;   Good marginal mandibular and hypoglossal function;   HEENT: grossly normal and no scleral icterus  Neck/Chest: normal , soft without lymphadenopathy, and right neck incision is present  Respiratory: breathing easily and without respiratory distress  Abdomen: normal and soft  Cardiology: regular rate and rhythm    Upper Extremity Arterial Exam:   Right - radial is palpable  Left - radial is palpable    Musculoskeletal:   Upper Extremity: normal bilateral hand function and normal bilateral hand sensation, right hand is warm and left hand is warm    Post Operative Incision:   Location: right neck  clean, dry, no drainage, and healed appropriately   Measurements:                  Assessment and Plan     Mr. Kim is a 63 y.o. " is s/p right carotid endarterectomy with subsequent carotid excision with interposition PTFE graft on 1/3/2024 for stroke risk reduction. He has done well during post operative recovery with a well-healed right neck incision with resolution of right neck swelling. Carotid duplex obtained today demonstrates RIGHT carotid with normal post operative changes and a peak systolic velocity of 90 cm/s. LEFT carotid demonstrates mild stenosis with a peak systolic velocity of 93 cm/s.  (Significant atherosclerosis was noted to left carotid on previous CTA).  Instructed to continue Aspirin and Plavix (clopidogrel) and statin therapy.  Recommend follow up in 6 month follow up with repeat duplex.        1. Bilateral carotid artery stenosis  - CV Ultrasound Bilateral Doppler Carotid; Future    2. S/P carotid endarterectomy  Right carotid endarterectomy 1/3/24        Imaging Obtained/Reviewed   Study: carotid duplex  Date:   2/20/24        Medical History     Past Medical History:   Diagnosis Date    Anxiety     Bilateral carotid artery stenosis     Collar bone fracture     Coronary artery disease     s/p single bypass - 2019    DDD (degenerative disc disease), cervical     Digestive disorder     acid reflux    History of heart attack 2019    HLD (hyperlipidemia)     HTN (hypertension)      Past Surgical History:   Procedure Laterality Date    ANTERIOR CRUCIATE LIGAMENT REPAIR Right 1990    CARDIAC CATHETERIZATION N/A 11/05/2019    Procedure: LEFT HEART CATH; Surgeon: Saritha Santos MD; Location: [facility] Cardiac Cath Labs; Service: Cardiovascular; Laterality: N/A;    CAROTID ENDARTERECTOMY Right 1/3/2024    Procedure: ENDARTERECTOMY-CAROTID;  Surgeon: Odette Nicholas MD;  Location: [facility];  Service: Peripheral Vascular;  Laterality: Right;  RIGHT    COLONOSCOPY      CORONARY ARTERY BYPASS GRAFT  11/09/2019    Robotically assisted off-pump CABG x1- Dr. VADIM Dumont    KNEE SURGERY Right     SHOULDER ARTHROSCOPY Right      rotator cuff repair    THORACOSCOPY      UPPER GASTROINTESTINAL ENDOSCOPY       Family History   Problem Relation Age of Onset    Stroke Mother     Hypertension Mother     Hyperlipidemia Mother     Hypertension Father     Hyperlipidemia Father     Heart disease Father     Heart attack Father     Hypertension Sister     Hyperlipidemia Sister     Hypertension Brother     Hyperlipidemia Brother     Arthritis Maternal Grandmother     Heart disease Maternal Grandfather     Stroke Paternal Grandmother      Social History     Socioeconomic History    Marital status:    Tobacco Use    Smoking status: Never    Smokeless tobacco: Never   Substance and Sexual Activity    Alcohol use: Never    Drug use: Never     Current Outpatient Medications   Medication Instructions    amlodipine-benazepril 10-20mg (LOTREL) 10-20 mg per capsule 1 capsule, Oral, Every morning    aspirin 81 MG Chew 1 tablet, Oral, Every morning    atorvastatin (LIPITOR) 40 mg, Oral, Daily    busPIRone (BUSPAR) 15 MG tablet Take 1 Table by mouth in the morning, 1 Tablet at noon and one tablet before bedtime    carvediloL (COREG) 12.5 mg, Oral, 2 times daily    clopidogreL (PLAVIX) 75 mg, Oral    esomeprazole (NEXIUM) 20 mg, Oral, Before breakfast    ipratropium (ATROVENT) 42 mcg (0.06 %) nasal spray USE 2 SPRAYS IN EACH NOSTRIL IN THE MORNING, 2 SPRAYS AT NOON, 2 SPRAYS IN THE EVENING, AND 2 SPRAYS BEFORE BEDTIME FOR 4 DAYS     Review of patient's allergies indicates:   Allergen Reactions    Hydrochlorothiazide      Other Reaction(s): Unknown    Metoprolol Other (See Comments)       Patient Care Team:  Mook Lerma DO as PCP - General (Family Medicine)  Saritha Santos MD as Referring Physician (Cardiology)  Ace Spicer MD as Referring Physician (Gastroenterology)        No follow-ups on file. In addition to their scheduled follow up, the patient has also been instructed to follow up on as needed basis.     Future Appointments   Date Time  Provider Department Center   8/22/2024  1:30 PM CV [facility]   8/27/2024  1:00 PM Odette Nicholas MD [facility]

## 2024-02-20 ENCOUNTER — OFFICE VISIT (OUTPATIENT)
Dept: VASCULAR SURGERY | Facility: CLINIC | Age: 64
End: 2024-02-20
Attending: SPECIALIST
Payer: COMMERCIAL

## 2024-02-20 VITALS
HEIGHT: 69 IN | HEART RATE: 60 BPM | SYSTOLIC BLOOD PRESSURE: 117 MMHG | BODY MASS INDEX: 25.18 KG/M2 | WEIGHT: 170 LBS | DIASTOLIC BLOOD PRESSURE: 77 MMHG

## 2024-02-20 DIAGNOSIS — I65.23 BILATERAL CAROTID ARTERY STENOSIS: Primary | ICD-10-CM

## 2024-02-20 DIAGNOSIS — Z98.890 S/P CAROTID ENDARTERECTOMY: ICD-10-CM

## 2024-02-20 PROCEDURE — 3008F BODY MASS INDEX DOCD: CPT | Mod: CPTII,,, | Performed by: SPECIALIST

## 2024-02-20 PROCEDURE — 99024 POSTOP FOLLOW-UP VISIT: CPT | Mod: ,,, | Performed by: SPECIALIST

## 2024-02-20 PROCEDURE — 1160F RVW MEDS BY RX/DR IN RCRD: CPT | Mod: CPTII,,, | Performed by: SPECIALIST

## 2024-02-20 PROCEDURE — 3074F SYST BP LT 130 MM HG: CPT | Mod: CPTII,,, | Performed by: SPECIALIST

## 2024-02-20 PROCEDURE — 3078F DIAST BP <80 MM HG: CPT | Mod: CPTII,,, | Performed by: SPECIALIST

## 2024-02-20 PROCEDURE — 1159F MED LIST DOCD IN RCRD: CPT | Mod: CPTII,,, | Performed by: SPECIALIST

## 2024-08-14 RX ORDER — BENAZEPRIL HYDROCHLORIDE 20 MG/1
1 TABLET ORAL DAILY
COMMUNITY
Start: 2024-04-13

## 2024-08-19 NOTE — PROGRESS NOTES
"    [facility] Vascular - Clinic Note  Odette Nicholas MD      Patient Name: Flip Kim                   : 1960      MRN: [id number]   Visit Date: 2024       History Present Illness     Reason for Visit: Carotid Artery Disease    Mr. Kim presents to the clinic for 6 month surveillance of carotid artery disease. He is s/p right carotid endarterectomy on 1/3/24. Carotid duplex obtained today. He denies signs or symptoms of stroke or TIA. Denies issues to his right neck or concerning changes to his incision.   Has been doing well overall.       REVIEW OF SYSTEMS:  12 point review of systems conducted, negative except as stated in the history of present illness. See HPI for details.        Physical Exam      Vitals:    24 1251 24 1252   BP: 119/76 123/75   BP Location: Left arm Right arm   Pulse: 76 78   Weight: 77.1 kg (170 lb)    Height: 5' 9" (1.753 m)           General: well-nourished, no acute distress, and healthy appearing, ambulating normally, alert, pleasant, conversant, and oriented  Neurologic: cranial nerves are grossly intact marginal mandibular of the facial nerve and hypoglossal nerve demonstrates appropriate function, no neurologic deficits, no motor deficits, and no sensory deficits  HEENT: grossly normal and no scleral icterus  Neck/Chest: normal , soft without lymphadenopathy, and right neck incision is present  Respiratory: breathing easily and without respiratory distress  Abdomen: normal and soft  Cardiology: regular rate and rhythm    Upper Extremity Arterial Exam:   Right - radial is palpable  Left - radial is palpable    Musculoskeletal:   Upper Extremity: normal bilateral hand function            Assessment and Plan     Mr. Kim is a 63 y.o. man with carotid artery disease who is s/p right carotid endarterectomy with subsequent carotid excision with interposition PTFE graft on 1/3/2024 for stroke risk reduction.  Carotid duplex obtained today demonstrates " RIGHT carotid with mild stenosis with a peak systolic velocity of 84 cm/s.  LEFT carotid demonstrates mild stenosis with a peak systolic velocity of 100 cm/s.  Recommend follow up in 6 months with repeat carotid duplex.  Continue Aspirin and Plavix (clopidogrel) and statin therapy.        1. Bilateral carotid artery stenosis  - CV Ultrasound Bilateral Doppler Carotid; Future    2. S/P carotid endarterectomy  - CV Ultrasound Bilateral Doppler Carotid; Future          Imaging Obtained/Reviewed   Study: carotid duplex  Date:   8/27/24        Medical History     Past Medical History:   Diagnosis Date    Anxiety     Bilateral carotid artery stenosis     Collar bone fracture     Coronary artery disease     s/p single bypass - 2019    DDD (degenerative disc disease), cervical     Digestive disorder     acid reflux    History of heart attack 2019    HLD (hyperlipidemia)     HTN (hypertension)      Past Surgical History:   Procedure Laterality Date    ANTERIOR CRUCIATE LIGAMENT REPAIR Right 1990    CARDIAC CATHETERIZATION N/A 11/05/2019    Procedure: LEFT HEART CATH; Surgeon: Saritha Santos MD; Location: [facility] Cardiac Cath Labs; Service: Cardiovascular; Laterality: N/A;    CAROTID ENDARTERECTOMY Right 1/3/2024    Procedure: ENDARTERECTOMY-CAROTID;  Surgeon: Odette Nicholas MD;  Location: [facility];  Service: Peripheral Vascular;  Laterality: Right;  RIGHT    COLONOSCOPY      CORONARY ARTERY BYPASS GRAFT  11/09/2019    Robotically assisted off-pump CABG x1- Dr. VADIM Dumont    KNEE SURGERY Right     SHOULDER ARTHROSCOPY Right     rotator cuff repair    THORACOSCOPY      UPPER GASTROINTESTINAL ENDOSCOPY       Family History   Problem Relation Name Age of Onset    Stroke Mother      Hypertension Mother      Hyperlipidemia Mother      Hypertension Father      Hyperlipidemia Father      Heart disease Father      Heart attack Father      Hypertension Sister      Hyperlipidemia Sister      Hypertension Brother       Hyperlipidemia Brother      Arthritis Maternal Grandmother      Heart disease Maternal Grandfather      Stroke Paternal Grandmother       Social History     Socioeconomic History    Marital status:    Tobacco Use    Smoking status: Never    Smokeless tobacco: Never   Substance and Sexual Activity    Alcohol use: Never    Drug use: Never     Social Determinants of Health     Financial Resource Strain: Low Risk  (5/20/2024)    Received from [facility] and Its Subsidiaries and Affiliates    Overall Financial Resource Strain (CARDIA)     Difficulty of Paying Living Expenses: Not very hard   Food Insecurity: No Food Insecurity (5/20/2024)    Received from [facility] and Its Subsidiaries and Affiliates    Hunger Vital Sign     Worried About Running Out of Food in the Last Year: Never true     Ran Out of Food in the Last Year: Never true   Transportation Needs: No Transportation Needs (5/20/2024)    Received from [facility] and Its Subsidiaries and Affiliates    PRAPARE - Transportation     Lack of Transportation (Medical): No     Lack of Transportation (Non-Medical): No   Physical Activity: Sufficiently Active (5/20/2024)    Received from [facility] and Its Subsidiaries and Affiliates    Exercise Vital Sign     Days of Exercise per Week: 5 days     Minutes of Exercise per Session: 50 min   Stress: Stress Concern Present (5/20/2024)    Received from [facility] and Its Subsidiaries and Affiliates    [place] [employer] - Occupational Stress Questionnaire     Feeling of Stress : To some extent   Housing Stability: Low Risk  (5/11/2023)    Received from [facility] and Its Subsidiaries and Affiliates    Housing Stability Vital Sign     Unable to Pay for Housing in the  Last Year: No     Number of Places Lived in the Last Year: 1     In the last 12 months, was there a time when you did not have a steady place to sleep or slept in a shelter (including now)?: No     Current Outpatient Medications   Medication Instructions    amlodipine-benazepril 10-20mg (LOTREL) 10-20 mg per capsule 1 capsule, Oral, Every morning    aspirin 81 MG Chew 1 tablet, Oral, Every morning    atorvastatin (LIPITOR) 40 mg, Oral, Daily    benazepriL (LOTENSIN) 20 MG tablet 1 tablet, Oral, Daily    busPIRone (BUSPAR) 15 MG tablet Take 1 Table by mouth in the morning, 1 Tablet at noon and one tablet before bedtime    carvediloL (COREG) 12.5 mg, Oral, 2 times daily    clopidogreL (PLAVIX) 75 mg, Oral    esomeprazole (NEXIUM) 20 mg, Oral, Before breakfast    ipratropium (ATROVENT) 42 mcg (0.06 %) nasal spray USE 2 SPRAYS IN EACH NOSTRIL IN THE MORNING, 2 SPRAYS AT NOON, 2 SPRAYS IN THE EVENING, AND 2 SPRAYS BEFORE BEDTIME FOR 4 DAYS    NIFEdipine (PROCARDIA-XL) 30 mg, Oral, Nightly     Review of patient's allergies indicates:   Allergen Reactions    Hydrochlorothiazide      Other Reaction(s): Unknown    Metoprolol Other (See Comments)       Patient Care Team:  Mook Lerma DO as PCP - General (Family Medicine)  Saritha Santos MD as Referring Physician (Cardiology)  Ace Spicer MD as Referring Physician (Gastroenterology)  Odette Nicholas MD as Vascular Surgery (Vascular Surgery)        No follow-ups on file. In addition to their scheduled follow up, the patient has also been instructed to follow up on as needed basis.     Future Appointments   Date Time Provider Department Center   2/28/2025  8:00 AM CV [facility] VASCULAR [facility]   3/11/2025  8:00 AM Odette Nicholas MD [facility]

## 2024-08-27 ENCOUNTER — OFFICE VISIT (OUTPATIENT)
Dept: VASCULAR SURGERY | Facility: CLINIC | Age: 64
End: 2024-08-27
Attending: SPECIALIST
Payer: COMMERCIAL

## 2024-08-27 VITALS
HEIGHT: 69 IN | SYSTOLIC BLOOD PRESSURE: 123 MMHG | DIASTOLIC BLOOD PRESSURE: 75 MMHG | BODY MASS INDEX: 25.18 KG/M2 | WEIGHT: 170 LBS | HEART RATE: 78 BPM

## 2024-08-27 DIAGNOSIS — Z98.890 S/P CAROTID ENDARTERECTOMY: ICD-10-CM

## 2024-08-27 DIAGNOSIS — I65.23 BILATERAL CAROTID ARTERY STENOSIS: Primary | ICD-10-CM

## 2024-08-27 PROCEDURE — 4010F ACE/ARB THERAPY RXD/TAKEN: CPT | Mod: CPTII,,, | Performed by: SPECIALIST

## 2024-08-27 PROCEDURE — 1159F MED LIST DOCD IN RCRD: CPT | Mod: CPTII,,, | Performed by: SPECIALIST

## 2024-08-27 PROCEDURE — 99213 OFFICE O/P EST LOW 20 MIN: CPT | Mod: ,,, | Performed by: SPECIALIST

## 2024-08-27 PROCEDURE — 3074F SYST BP LT 130 MM HG: CPT | Mod: CPTII,,, | Performed by: SPECIALIST

## 2024-08-27 PROCEDURE — 3078F DIAST BP <80 MM HG: CPT | Mod: CPTII,,, | Performed by: SPECIALIST

## 2024-08-27 PROCEDURE — 1160F RVW MEDS BY RX/DR IN RCRD: CPT | Mod: CPTII,,, | Performed by: SPECIALIST

## 2024-08-27 PROCEDURE — 3008F BODY MASS INDEX DOCD: CPT | Mod: CPTII,,, | Performed by: SPECIALIST

## 2024-08-27 RX ORDER — NIFEDIPINE 30 MG/1
30 TABLET, EXTENDED RELEASE ORAL NIGHTLY
COMMUNITY
Start: 2024-08-05

## 2025-02-24 NOTE — PROGRESS NOTES
"    [facility] Vascular - Clinic Note  Odette Nicholas MD      Patient Name: Flip Kim                   : 1960      MRN: [id number]   Visit Date: 3/11/2025       History Present Illness     Reason for Visit: Carotid Artery Disease    Mr. Kim presents to the clinic  for 6 month surveillance of carotid artery disease. He is s/p right carotid endarterectomy on 1/3/24. Carotid duplex obtained 25. He denies signs or symptoms of stroke or TIA.       REVIEW OF SYSTEMS:  Review of systems conducted, negative except as stated in the history of present illness. See HPI for details.        Physical Exam      Vitals:    25 0753 25 0755   BP: 129/78 121/74   BP Location: Right arm Left arm   Patient Position: Sitting Sitting   Pulse: (!) 52 (!) 53   Weight: 77.1 kg (170 lb)    Height: 5' 9" (1.753 m)           General: well-nourished, no acute distress, and healthy appearing, ambulating normally, alert, pleasant, conversant, and oriented  Neurologic: cranial nerves are grossly intact, no neurologic deficits, no motor deficits, and no sensory deficits  HEENT: grossly normal and no scleral icterus  Neck/Chest: normal , soft without lymphadenopathy, and right neck incision is present  Respiratory: breathing easily and without respiratory distress  Abdomen: normal and soft  Cardiology: regular rate and rhythm    Upper Extremity Arterial Exam:   Right - radial is palpable  Left - radial is palpable      Musculoskeletal:   Upper Extremity: normal bilateral hand function  Lower Extremity: no edema present to bilateral lower extremities          Assessment and Plan     Mr. Kim is a 64 y.o. with asymptomatic carotid artery stenosis. He is s/p right carotid endarterectomy with subsequent carotid excision with interposition PTFE graft on 1/3/2024 for stroke risk reduction. Carotid duplex obtained today demonstrates RIGHT carotid with mild stenosis with a peak systolic velocity of 78 cm/s.  LEFT " carotid demonstrates mild stenosis with a peak systolic velocity of 76 cm/s. Recommend follow up in 1 year with repeat carotid duplex. Continue Aspirin and Plavix (clopidogrel) and statin therapy.        1. Bilateral carotid artery stenosis  - CV Ultrasound Bilateral Doppler Carotid; Future    2. S/P carotid endarterectomy  - CV Ultrasound Bilateral Doppler Carotid; Future          Imaging Obtained/Reviewed   Study: carotid duplex  Date:   2/28/25        Medical History     Past Medical History:   Diagnosis Date    Anxiety     Bilateral carotid artery stenosis     Collar bone fracture     Coronary artery disease     s/p single bypass - 2019    DDD (degenerative disc disease), cervical     Digestive disorder     acid reflux    History of heart attack 2019    HLD (hyperlipidemia)     HTN (hypertension)      Past Surgical History:   Procedure Laterality Date    ANTERIOR CRUCIATE LIGAMENT REPAIR Right 1990    CARDIAC CATHETERIZATION N/A 11/05/2019    Procedure: LEFT HEART CATH; Surgeon: Saritha Santos MD; Location: [facility]; Service: Cardiovascular; Laterality: N/A;    CAROTID ENDARTERECTOMY Right 1/3/2024    Procedure: ENDARTERECTOMY-CAROTID;  Surgeon: Odette Nicholas MD;  [facility]: [facility];  Service: Peripheral Vascular;  Laterality: Right;  RIGHT    COLONOSCOPY      CORONARY ARTERY BYPASS GRAFT  11/09/2019    Robotically assisted off-pump CABG x1- Dr. VADIM Dumont    KNEE SURGERY Right     SHOULDER ARTHROSCOPY Right     rotator cuff repair    THORACOSCOPY      UPPER GASTROINTESTINAL ENDOSCOPY       Family History   Problem Relation Name Age of Onset    Stroke Mother      Hypertension Mother      Hyperlipidemia Mother      Hypertension Father      Hyperlipidemia Father      Heart disease Father      Heart attack Father      Hypertension Sister      Hyperlipidemia Sister      Hypertension Brother      Hyperlipidemia Brother      Arthritis Maternal Grandmother      Heart disease Maternal Grandfather       Stroke Paternal Grandmother       Social History[1]  Current Outpatient Medications   Medication Instructions    amlodipine-benazepril 10-20mg (LOTREL) 10-20 mg per capsule 1 capsule, Every morning    aspirin 81 MG Chew 1 tablet, Every morning    atorvastatin (LIPITOR) 40 mg, Daily    benazepriL (LOTENSIN) 20 MG tablet 1 tablet, Daily    busPIRone (BUSPAR) 15 MG tablet Take 1 Table by mouth in the morning, 1 Tablet at noon and one tablet before bedtime    carvediloL (COREG) 12.5 mg, 2 times daily    clopidogreL (PLAVIX) 75 mg    esomeprazole (NEXIUM) 20 mg, Before breakfast    ipratropium (ATROVENT) 42 mcg (0.06 %) nasal spray USE 2 SPRAYS IN EACH NOSTRIL IN THE MORNING, 2 SPRAYS AT NOON, 2 SPRAYS IN THE EVENING, AND 2 SPRAYS BEFORE BEDTIME FOR 4 DAYS    NIFEdipine (PROCARDIA-XL) 30 mg, Nightly     Review of patient's allergies indicates:   Allergen Reactions    Hydrochlorothiazide      Other Reaction(s): Unknown    Metoprolol Other (See Comments)       Patient Care Team:  Mook Lerma DO as PCP - General (Family Medicine)  Saritha Santos MD as Referring Physician (Cardiology)  Ace Spicer MD as Referring Physician (Gastroenterology)  Odette Nicholas MD as Vascular Surgery (Vascular Surgery)        No follow-ups on file. In addition to their scheduled follow up, the patient has also been instructed to follow up on as needed basis.     No future appointments.            [1]   Social History  Socioeconomic History    Marital status:    Tobacco Use    Smoking status: Never    Smokeless tobacco: Never   Substance and Sexual Activity    Alcohol use: Never    Drug use: Never     Social Drivers of Health     Financial Resource Strain: Low Risk  (5/20/2024)    [facility] Aff[facility]    Overall Financial Resource Strain (CARDIA)     Difficulty of Paying Living Expenses: Not very hard   Food Insecurity: No Food Insecurity  (5/20/2024)    [facility]    Hunger Vital Sign     Worried About Running Out of Food in the Last Year: Never true     Ran Out of Food in the Last Year: Never true   Transportation Needs: No Transportation Needs (5/20/2024)    [facility]    PRAPARE - Transportation     Lack of Transportation (Medical): No     Lack of Transportation (Non-Medical): No   Physical Activity: Sufficiently Active (5/20/2024)    [facility]    Exercise Vital Sign     Days of Exercise per Week: 5 days     Minutes of Exercise per Session: 50 min   Stress: Stress Concern Present (5/20/2024)    [facility]    [employer] - Occupational Stress Questionnaire     Feeling of Stress : To some extent   Housing Stability: Low Risk  (5/11/2023)    [facility]    Housing Stability Vital Sign     Unable to Pay for Housing in the Last Year: No     Number of Places Lived in the Last Year: 1     In the last 12 months, was there a time when you did not have a steady place to sleep or slept in a shelter (including now)?: No

## 2025-03-11 ENCOUNTER — OFFICE VISIT (OUTPATIENT)
Dept: VASCULAR SURGERY | Facility: CLINIC | Age: 65
End: 2025-03-11
Attending: SPECIALIST
Payer: COMMERCIAL

## 2025-03-11 VITALS
BODY MASS INDEX: 25.18 KG/M2 | DIASTOLIC BLOOD PRESSURE: 74 MMHG | SYSTOLIC BLOOD PRESSURE: 121 MMHG | HEART RATE: 53 BPM | HEIGHT: 69 IN | WEIGHT: 170 LBS

## 2025-03-11 DIAGNOSIS — I65.23 BILATERAL CAROTID ARTERY STENOSIS: Primary | ICD-10-CM

## 2025-03-11 DIAGNOSIS — Z98.890 S/P CAROTID ENDARTERECTOMY: ICD-10-CM

## 2025-03-11 PROCEDURE — 1159F MED LIST DOCD IN RCRD: CPT | Mod: CPTII,,, | Performed by: SPECIALIST

## 2025-03-11 PROCEDURE — 3008F BODY MASS INDEX DOCD: CPT | Mod: CPTII,,, | Performed by: SPECIALIST

## 2025-03-11 PROCEDURE — 3078F DIAST BP <80 MM HG: CPT | Mod: CPTII,,, | Performed by: SPECIALIST

## 2025-03-11 PROCEDURE — 1160F RVW MEDS BY RX/DR IN RCRD: CPT | Mod: CPTII,,, | Performed by: SPECIALIST

## 2025-03-11 PROCEDURE — 4010F ACE/ARB THERAPY RXD/TAKEN: CPT | Mod: CPTII,,, | Performed by: SPECIALIST

## 2025-03-11 PROCEDURE — 3074F SYST BP LT 130 MM HG: CPT | Mod: CPTII,,, | Performed by: SPECIALIST

## 2025-03-11 PROCEDURE — 99213 OFFICE O/P EST LOW 20 MIN: CPT | Mod: ,,, | Performed by: SPECIALIST

## (undated) DEVICE — ADHESIVE DERMABOND ADVANCED

## (undated) DEVICE — CLIP HORIZON LIG TI MED-LG

## (undated) DEVICE — DRAPE INCISE IOBAN 2 13X13IN

## (undated) DEVICE — SUT VICRYL 3-0 27 SH

## (undated) DEVICE — NDL HEPARIN FLUSHING

## (undated) DEVICE — BOWL STERILE LARGE 32OZ

## (undated) DEVICE — SUT MCRYL PLUS 4-0 PS2 27IN

## (undated) DEVICE — LOOP STERION MAXI YEL 1X406MM

## (undated) DEVICE — SUT PROLENE 7-0 BV-1 30 BLU

## (undated) DEVICE — ELECTRODE PATIENT RETURN DISP

## (undated) DEVICE — SOL NORMAL USPCA 0.9%

## (undated) DEVICE — WIPE MERCL POLYVIL ACETL 3X3IN

## (undated) DEVICE — SUT PROLENE 6-0 BV-1 30IN

## (undated) DEVICE — Device

## (undated) DEVICE — SUT 4-0 12-18IN SILK BLACK

## (undated) DEVICE — COVER PROBE US 5.5X58L NON LTX

## (undated) DEVICE — NDL 27G X 1 1/4

## (undated) DEVICE — CLIP LIGATING HEMOCLP SMALL

## (undated) DEVICE — SUT 3-0 12-18IN SILK

## (undated) DEVICE — BAG MEDI-PLAST DECANTER C-FLOW

## (undated) DEVICE — KIT SURGIFLO HEMOSTATIC MATRIX

## (undated) DEVICE — GLOVE PROTEXIS HYDROGEL SZ6.5

## (undated) DEVICE — LOOP STERION MINI RED 8X406MM

## (undated) DEVICE — SUT GORETEX CV-6 TTC-09 24IN

## (undated) DEVICE — TUBE SUCTION MEDI-VAC STERILE

## (undated) DEVICE — KIT SURGICAL TURNOVER

## (undated) DEVICE — SUT 2-0 12-18IN SILK

## (undated) DEVICE — PATCH XENOSURE TAPR .8X8CM
Type: IMPLANTABLE DEVICE | Site: CAROTID | Status: NON-FUNCTIONAL
Removed: 2024-01-03

## (undated) DEVICE — HEMOSTAT SURGICEL FIBRLR 2X4IN